# Patient Record
Sex: MALE | Race: WHITE | Employment: OTHER | URBAN - NONMETROPOLITAN AREA
[De-identification: names, ages, dates, MRNs, and addresses within clinical notes are randomized per-mention and may not be internally consistent; named-entity substitution may affect disease eponyms.]

---

## 2017-01-17 LAB
ALBUMIN SERPL-MCNC: 3.4 G/DL
ALP BLD-CCNC: 128 U/L
ALT SERPL-CCNC: 18 U/L
AST SERPL-CCNC: 16 U/L
BASOPHILS ABSOLUTE: ABNORMAL /ΜL
BASOPHILS RELATIVE PERCENT: ABNORMAL %
BILIRUB SERPL-MCNC: 0.2 MG/DL (ref 0.1–1.4)
BUN BLDV-MCNC: 51 MG/DL
CALCIUM SERPL-MCNC: 8.5 MG/DL
CHLORIDE BLD-SCNC: 104 MMOL/L
CO2: 26 MMOL/L
CREAT SERPL-MCNC: 2.4 MG/DL
EOSINOPHILS ABSOLUTE: ABNORMAL /ΜL
EOSINOPHILS RELATIVE PERCENT: ABNORMAL %
GFR CALCULATED: 28
GLUCOSE BLD-MCNC: 209 MG/DL
HBA1C MFR BLD: 8.5 %
HCT VFR BLD CALC: 33.7 % (ref 41–53)
HEMOGLOBIN: 10.7 G/DL (ref 13.5–17.5)
LYMPHOCYTES ABSOLUTE: ABNORMAL /ΜL
LYMPHOCYTES RELATIVE PERCENT: ABNORMAL %
MCH RBC QN AUTO: ABNORMAL PG
MCHC RBC AUTO-ENTMCNC: ABNORMAL G/DL
MCV RBC AUTO: ABNORMAL FL
MONOCYTES ABSOLUTE: ABNORMAL /ΜL
MONOCYTES RELATIVE PERCENT: ABNORMAL %
NEUTROPHILS ABSOLUTE: ABNORMAL /ΜL
NEUTROPHILS RELATIVE PERCENT: ABNORMAL %
PLATELET # BLD: 316 K/ΜL
PMV BLD AUTO: ABNORMAL FL
POTASSIUM SERPL-SCNC: 4.3 MMOL/L
RBC # BLD: 3.98 10^6/ΜL
SODIUM BLD-SCNC: 139 MMOL/L
TOTAL PROTEIN: 7.7
WBC # BLD: ABNORMAL 10^3/ML

## 2017-02-20 RX ORDER — OXYBUTYNIN CHLORIDE 10 MG/1
TABLET, EXTENDED RELEASE ORAL
Qty: 30 TABLET | Refills: 4 | Status: SHIPPED | OUTPATIENT
Start: 2017-02-20 | End: 2017-07-21 | Stop reason: SDUPTHER

## 2017-02-24 ENCOUNTER — OFFICE VISIT (OUTPATIENT)
Dept: NEPHROLOGY | Age: 77
End: 2017-02-24

## 2017-02-24 VITALS — SYSTOLIC BLOOD PRESSURE: 124 MMHG | BODY MASS INDEX: 38.63 KG/M2 | DIASTOLIC BLOOD PRESSURE: 80 MMHG | WEIGHT: 277 LBS

## 2017-02-24 DIAGNOSIS — E55.9 VITAMIN D DEFICIENCY: ICD-10-CM

## 2017-02-24 DIAGNOSIS — N18.30 CHRONIC KIDNEY DISEASE, STAGE III (MODERATE) (HCC): ICD-10-CM

## 2017-02-24 DIAGNOSIS — N18.3 DIABETES MELLITUS DUE TO UNDERLYING CONDITION WITH STAGE 3 CHRONIC KIDNEY DISEASE, UNSPECIFIED LONG TERM INSULIN USE STATUS: ICD-10-CM

## 2017-02-24 DIAGNOSIS — D63.1 ANEMIA IN CHRONIC RENAL DISEASE: ICD-10-CM

## 2017-02-24 DIAGNOSIS — I10 ESSENTIAL HYPERTENSION: ICD-10-CM

## 2017-02-24 DIAGNOSIS — E08.22 DIABETES MELLITUS DUE TO UNDERLYING CONDITION WITH STAGE 3 CHRONIC KIDNEY DISEASE, UNSPECIFIED LONG TERM INSULIN USE STATUS: ICD-10-CM

## 2017-02-24 DIAGNOSIS — N18.9 ANEMIA IN CHRONIC RENAL DISEASE: ICD-10-CM

## 2017-02-24 DIAGNOSIS — N17.9 AKI (ACUTE KIDNEY INJURY) (HCC): Primary | ICD-10-CM

## 2017-02-24 DIAGNOSIS — R80.9 PROTEINURIA: ICD-10-CM

## 2017-02-24 PROCEDURE — 4040F PNEUMOC VAC/ADMIN/RCVD: CPT | Performed by: INTERNAL MEDICINE

## 2017-02-24 PROCEDURE — G8419 CALC BMI OUT NRM PARAM NOF/U: HCPCS | Performed by: INTERNAL MEDICINE

## 2017-02-24 PROCEDURE — G8484 FLU IMMUNIZE NO ADMIN: HCPCS | Performed by: INTERNAL MEDICINE

## 2017-02-24 PROCEDURE — 1123F ACP DISCUSS/DSCN MKR DOCD: CPT | Performed by: INTERNAL MEDICINE

## 2017-02-24 PROCEDURE — 1036F TOBACCO NON-USER: CPT | Performed by: INTERNAL MEDICINE

## 2017-02-24 PROCEDURE — 99213 OFFICE O/P EST LOW 20 MIN: CPT | Performed by: INTERNAL MEDICINE

## 2017-02-24 PROCEDURE — G8427 DOCREV CUR MEDS BY ELIG CLIN: HCPCS | Performed by: INTERNAL MEDICINE

## 2017-02-27 LAB
BUN BLDV-MCNC: 31 MG/DL
CALCIUM SERPL-MCNC: 8.6 MG/DL
CHLORIDE BLD-SCNC: 104 MMOL/L
CO2: 29 MMOL/L
CREAT SERPL-MCNC: 1.9 MG/DL
GFR CALCULATED: 37
GLUCOSE BLD-MCNC: 182 MG/DL
POTASSIUM SERPL-SCNC: 4.3 MMOL/L
SODIUM BLD-SCNC: 140 MMOL/L

## 2017-03-10 ENCOUNTER — OFFICE VISIT (OUTPATIENT)
Dept: NEPHROLOGY | Age: 77
End: 2017-03-10

## 2017-03-10 VITALS
SYSTOLIC BLOOD PRESSURE: 102 MMHG | DIASTOLIC BLOOD PRESSURE: 80 MMHG | WEIGHT: 270 LBS | BODY MASS INDEX: 37.66 KG/M2 | HEART RATE: 68 BPM | RESPIRATION RATE: 20 BRPM

## 2017-03-10 DIAGNOSIS — D63.1 ANEMIA IN CHRONIC RENAL DISEASE: ICD-10-CM

## 2017-03-10 DIAGNOSIS — R80.9 PROTEINURIA: ICD-10-CM

## 2017-03-10 DIAGNOSIS — N17.9 AKI (ACUTE KIDNEY INJURY) (HCC): Primary | ICD-10-CM

## 2017-03-10 DIAGNOSIS — N18.30 CHRONIC KIDNEY DISEASE, STAGE III (MODERATE) (HCC): ICD-10-CM

## 2017-03-10 DIAGNOSIS — I10 ESSENTIAL HYPERTENSION: ICD-10-CM

## 2017-03-10 DIAGNOSIS — E08.22 DIABETES MELLITUS DUE TO UNDERLYING CONDITION WITH STAGE 3 CHRONIC KIDNEY DISEASE, UNSPECIFIED LONG TERM INSULIN USE STATUS: ICD-10-CM

## 2017-03-10 DIAGNOSIS — N18.9 ANEMIA IN CHRONIC RENAL DISEASE: ICD-10-CM

## 2017-03-10 DIAGNOSIS — E55.9 VITAMIN D DEFICIENCY: ICD-10-CM

## 2017-03-10 DIAGNOSIS — N18.3 DIABETES MELLITUS DUE TO UNDERLYING CONDITION WITH STAGE 3 CHRONIC KIDNEY DISEASE, UNSPECIFIED LONG TERM INSULIN USE STATUS: ICD-10-CM

## 2017-03-10 PROCEDURE — G8419 CALC BMI OUT NRM PARAM NOF/U: HCPCS | Performed by: INTERNAL MEDICINE

## 2017-03-10 PROCEDURE — 99214 OFFICE O/P EST MOD 30 MIN: CPT | Performed by: INTERNAL MEDICINE

## 2017-03-10 PROCEDURE — 4040F PNEUMOC VAC/ADMIN/RCVD: CPT | Performed by: INTERNAL MEDICINE

## 2017-03-10 PROCEDURE — G8427 DOCREV CUR MEDS BY ELIG CLIN: HCPCS | Performed by: INTERNAL MEDICINE

## 2017-03-10 PROCEDURE — 1123F ACP DISCUSS/DSCN MKR DOCD: CPT | Performed by: INTERNAL MEDICINE

## 2017-03-10 PROCEDURE — G8484 FLU IMMUNIZE NO ADMIN: HCPCS | Performed by: INTERNAL MEDICINE

## 2017-03-10 PROCEDURE — 1036F TOBACCO NON-USER: CPT | Performed by: INTERNAL MEDICINE

## 2017-07-21 RX ORDER — OXYBUTYNIN CHLORIDE 10 MG/1
TABLET, EXTENDED RELEASE ORAL
Qty: 30 TABLET | Refills: 4 | Status: SHIPPED | OUTPATIENT
Start: 2017-07-21

## 2017-08-31 LAB
BUN BLDV-MCNC: 39 MG/DL
CALCIUM SERPL-MCNC: 8.8 MG/DL
CHLORIDE BLD-SCNC: 105 MMOL/L
CO2: 21 MMOL/L
CREAT SERPL-MCNC: 2 MG/DL
GFR CALCULATED: 35
GLUCOSE BLD-MCNC: 165 MG/DL
PHOSPHORUS: 4.1 MG/DL
POTASSIUM SERPL-SCNC: 4.2 MMOL/L
PTH INTACT: 47.9
SODIUM BLD-SCNC: 137 MMOL/L
VITAMIN D 25-HYDROXY: 45.4
VITAMIN D2, 25 HYDROXY: NORMAL
VITAMIN D3,25 HYDROXY: NORMAL

## 2017-09-15 ENCOUNTER — OFFICE VISIT (OUTPATIENT)
Dept: NEPHROLOGY | Age: 77
End: 2017-09-15
Payer: MEDICARE

## 2017-09-15 VITALS
BODY MASS INDEX: 38.63 KG/M2 | DIASTOLIC BLOOD PRESSURE: 70 MMHG | SYSTOLIC BLOOD PRESSURE: 102 MMHG | WEIGHT: 277 LBS | RESPIRATION RATE: 16 BRPM | HEART RATE: 68 BPM

## 2017-09-15 DIAGNOSIS — D63.1 ANEMIA IN CHRONIC RENAL DISEASE: ICD-10-CM

## 2017-09-15 DIAGNOSIS — N18.9 ANEMIA IN CHRONIC RENAL DISEASE: ICD-10-CM

## 2017-09-15 DIAGNOSIS — N18.30 CHRONIC KIDNEY DISEASE, STAGE III (MODERATE) (HCC): Primary | ICD-10-CM

## 2017-09-15 DIAGNOSIS — R80.8 OTHER PROTEINURIA: ICD-10-CM

## 2017-09-15 DIAGNOSIS — I10 ESSENTIAL HYPERTENSION: ICD-10-CM

## 2017-09-15 DIAGNOSIS — N18.3 DIABETES MELLITUS DUE TO UNDERLYING CONDITION WITH STAGE 3 CHRONIC KIDNEY DISEASE, UNSPECIFIED LONG TERM INSULIN USE STATUS: ICD-10-CM

## 2017-09-15 DIAGNOSIS — E55.9 VITAMIN D DEFICIENCY: ICD-10-CM

## 2017-09-15 DIAGNOSIS — E08.22 DIABETES MELLITUS DUE TO UNDERLYING CONDITION WITH STAGE 3 CHRONIC KIDNEY DISEASE, UNSPECIFIED LONG TERM INSULIN USE STATUS: ICD-10-CM

## 2017-09-15 PROCEDURE — 1123F ACP DISCUSS/DSCN MKR DOCD: CPT | Performed by: INTERNAL MEDICINE

## 2017-09-15 PROCEDURE — 99213 OFFICE O/P EST LOW 20 MIN: CPT | Performed by: INTERNAL MEDICINE

## 2017-09-15 PROCEDURE — 4040F PNEUMOC VAC/ADMIN/RCVD: CPT | Performed by: INTERNAL MEDICINE

## 2017-09-15 PROCEDURE — G8417 CALC BMI ABV UP PARAM F/U: HCPCS | Performed by: INTERNAL MEDICINE

## 2017-09-15 PROCEDURE — 1036F TOBACCO NON-USER: CPT | Performed by: INTERNAL MEDICINE

## 2017-09-15 PROCEDURE — G8427 DOCREV CUR MEDS BY ELIG CLIN: HCPCS | Performed by: INTERNAL MEDICINE

## 2018-02-26 LAB
BUN BLDV-MCNC: 24 MG/DL
CALCIUM SERPL-MCNC: 9.1 MG/DL
CHLORIDE BLD-SCNC: 106 MMOL/L
CO2: 27 MMOL/L
CREAT SERPL-MCNC: 1.7 MG/DL
GFR CALCULATED: 42
GLUCOSE BLD-MCNC: 60 MG/DL
PHOSPHORUS: 3.5 MG/DL
POTASSIUM SERPL-SCNC: 3.7 MMOL/L
PTH INTACT: 49.1
SODIUM BLD-SCNC: 142 MMOL/L
VITAMIN D 25-HYDROXY: 45.2
VITAMIN D2, 25 HYDROXY: NORMAL
VITAMIN D3,25 HYDROXY: NORMAL

## 2018-03-09 ENCOUNTER — OFFICE VISIT (OUTPATIENT)
Dept: NEPHROLOGY | Age: 78
End: 2018-03-09
Payer: MEDICARE

## 2018-03-09 VITALS
HEART RATE: 72 BPM | BODY MASS INDEX: 37.66 KG/M2 | DIASTOLIC BLOOD PRESSURE: 90 MMHG | WEIGHT: 270 LBS | RESPIRATION RATE: 18 BRPM | SYSTOLIC BLOOD PRESSURE: 160 MMHG

## 2018-03-09 DIAGNOSIS — E55.9 VITAMIN D DEFICIENCY: ICD-10-CM

## 2018-03-09 DIAGNOSIS — D63.8 ANEMIA OF CHRONIC DISEASE: ICD-10-CM

## 2018-03-09 DIAGNOSIS — I10 ESSENTIAL HYPERTENSION: ICD-10-CM

## 2018-03-09 DIAGNOSIS — N18.3 DIABETES MELLITUS DUE TO UNDERLYING CONDITION WITH STAGE 3 CHRONIC KIDNEY DISEASE, UNSPECIFIED LONG TERM INSULIN USE STATUS: ICD-10-CM

## 2018-03-09 DIAGNOSIS — E08.22 DIABETES MELLITUS DUE TO UNDERLYING CONDITION WITH STAGE 3 CHRONIC KIDNEY DISEASE, UNSPECIFIED LONG TERM INSULIN USE STATUS: ICD-10-CM

## 2018-03-09 DIAGNOSIS — N18.30 CKD (CHRONIC KIDNEY DISEASE), STAGE III (HCC): Primary | ICD-10-CM

## 2018-03-09 PROCEDURE — G8484 FLU IMMUNIZE NO ADMIN: HCPCS | Performed by: INTERNAL MEDICINE

## 2018-03-09 PROCEDURE — G8417 CALC BMI ABV UP PARAM F/U: HCPCS | Performed by: INTERNAL MEDICINE

## 2018-03-09 PROCEDURE — 1036F TOBACCO NON-USER: CPT | Performed by: INTERNAL MEDICINE

## 2018-03-09 PROCEDURE — 99213 OFFICE O/P EST LOW 20 MIN: CPT | Performed by: INTERNAL MEDICINE

## 2018-03-09 PROCEDURE — G8427 DOCREV CUR MEDS BY ELIG CLIN: HCPCS | Performed by: INTERNAL MEDICINE

## 2018-03-09 PROCEDURE — 1123F ACP DISCUSS/DSCN MKR DOCD: CPT | Performed by: INTERNAL MEDICINE

## 2018-03-09 PROCEDURE — 4040F PNEUMOC VAC/ADMIN/RCVD: CPT | Performed by: INTERNAL MEDICINE

## 2018-03-09 RX ORDER — VITAMIN E 268 MG
400 CAPSULE ORAL DAILY
COMMUNITY
End: 2018-07-13 | Stop reason: ALTCHOICE

## 2018-03-09 RX ORDER — DULOXETIN HYDROCHLORIDE 30 MG/1
30 CAPSULE, DELAYED RELEASE ORAL DAILY
COMMUNITY
End: 2018-07-13 | Stop reason: ALTCHOICE

## 2018-03-09 RX ORDER — HYDRALAZINE HYDROCHLORIDE 25 MG/1
25 TABLET, FILM COATED ORAL 2 TIMES DAILY
COMMUNITY

## 2018-03-09 NOTE — PROGRESS NOTES
Renal Progress Note    Assessment and Plan:     1. CKD (chronic kidney disease), stage III     2. Essential hypertension     3. Diabetes mellitus due to underlying condition with stage 3 chronic kidney disease, unspecified long term insulin use status ([facility] [address]     4. Vitamin D deficiency     5. Anemia of chronic disease       PLAN:  Labs reviewed with the patient and he understood  Serum creatinine is improved to 1.7 mg/dL from 1.9 mg/dL  Vitamin D level is normal as 45  PTH is normal at 49  Medications reviewed  No changes  Return visit in 6 months and maybe 12 months there after if the serum creatinine improves more    Patient Active Problem List   Diagnosis    Type II or unspecified type diabetes mellitus without mention of complication, not stated as uncontrolled    Chronic kidney disease, stage III (moderate)    Hypertension    Proteinuria    Vitamin D deficiency    Anemia in chronic renal disease    Diabetes mellitus with renal manifestation (HCC)    ADELINA (acute kidney injury) ([facility] [address]    CKD (chronic kidney disease), stage III    Anemia of chronic disease       Subjective:   Chief complaint:  Chief Complaint   Patient presents with    6 Month Follow-Up    Chronic Kidney Disease     Stage 3      HPI:This is a follow up visit for Mr. Michelet Adkins who is here today for return appointment. I see him for chronic kidney disease. He was last seen about 6 months ago. Serum creatinine was 1.9 mg/dL. Today it is 1.7 mg/dL. He is doing well with no complaint. He saw Dr. Jose C Ashton from urology and was prescribed a new medication for urinary frequency.   myebetriq    ROS:Constitutional: negative  Eyes: negative  Ears, nose, mouth, throat, and face: negative  Respiratory: negative  Cardiovascular: positive for lower extremity edema  Gastrointestinal: negative  Genitourinary:positive for frequency  Integument/breast: negative  Hematologic/lymphatic: negative  Musculoskeletal:positive for arthralgias and stiff

## 2018-05-02 LAB
AVERAGE GLUCOSE: NORMAL
BUN BLDV-MCNC: 35 MG/DL
CALCIUM SERPL-MCNC: 8.8 MG/DL
CHLORIDE BLD-SCNC: 104 MMOL/L
CO2: 28 MMOL/L
CREAT SERPL-MCNC: 2.1 MG/DL
GFR CALCULATED: 33
GLUCOSE BLD-MCNC: 116 MG/DL
HBA1C MFR BLD: 8.6 %
POTASSIUM SERPL-SCNC: 4.6 MMOL/L
SODIUM BLD-SCNC: 138 MMOL/L

## 2018-06-21 LAB
BASOPHILS ABSOLUTE: ABNORMAL /ΜL
BASOPHILS RELATIVE PERCENT: ABNORMAL %
BUN BLDV-MCNC: 47 MG/DL
CALCIUM SERPL-MCNC: 8.7 MG/DL
CHLORIDE BLD-SCNC: 105 MMOL/L
CO2: 19 MMOL/L
CREAT SERPL-MCNC: 2 MG/DL
EOSINOPHILS ABSOLUTE: ABNORMAL /ΜL
EOSINOPHILS RELATIVE PERCENT: ABNORMAL %
GFR CALCULATED: 35
GLUCOSE BLD-MCNC: 159 MG/DL
HCT VFR BLD CALC: 33.9 % (ref 41–53)
HEMOGLOBIN: 10.6 G/DL (ref 13.5–17.5)
LYMPHOCYTES ABSOLUTE: ABNORMAL /ΜL
LYMPHOCYTES RELATIVE PERCENT: ABNORMAL %
MCH RBC QN AUTO: ABNORMAL PG
MCHC RBC AUTO-ENTMCNC: ABNORMAL G/DL
MCV RBC AUTO: ABNORMAL FL
MONOCYTES ABSOLUTE: ABNORMAL /ΜL
MONOCYTES RELATIVE PERCENT: ABNORMAL %
NEUTROPHILS ABSOLUTE: ABNORMAL /ΜL
NEUTROPHILS RELATIVE PERCENT: ABNORMAL %
PLATELET # BLD: 296 K/ΜL
PMV BLD AUTO: ABNORMAL FL
POTASSIUM SERPL-SCNC: 4.4 MMOL/L
RBC # BLD: 4.15 10^6/ΜL
SODIUM BLD-SCNC: 138 MMOL/L
WBC # BLD: 7.63 10^3/ML

## 2018-07-10 ENCOUNTER — HOSPITAL ENCOUNTER (OUTPATIENT)
Dept: GENERAL RADIOLOGY | Age: 78
Discharge: HOME | End: 2018-07-10
Payer: MEDICARE

## 2018-07-10 ENCOUNTER — HOSPITAL ENCOUNTER (OUTPATIENT)
Dept: PREADMISSION TESTING | Age: 78
Discharge: HOME | End: 2018-07-14
Payer: MEDICARE

## 2018-07-10 VITALS
WEIGHT: 275.57 LBS | DIASTOLIC BLOOD PRESSURE: 67 MMHG | HEART RATE: 80 BPM | BODY MASS INDEX: 38.58 KG/M2 | RESPIRATION RATE: 16 BRPM | OXYGEN SATURATION: 97 % | TEMPERATURE: 97.5 F | HEIGHT: 71 IN | SYSTOLIC BLOOD PRESSURE: 142 MMHG

## 2018-07-10 DIAGNOSIS — Z01.818 PRE-OP TESTING: ICD-10-CM

## 2018-07-10 LAB
ALBUMIN SERPL-MCNC: 3.7 G/DL (ref 3.5–5.1)
ANION GAP SERPL CALCULATED.3IONS-SCNC: 15 MEQ/L (ref 8–16)
AVERAGE GLUCOSE: 186 MG/DL (ref 70–126)
BASOPHILS # BLD: 0.4 %
BASOPHILS ABSOLUTE: 0 THOU/MM3 (ref 0–0.1)
BUN BLDV-MCNC: 36 MG/DL (ref 7–22)
CALCIUM SERPL-MCNC: 8.9 MG/DL (ref 8.5–10.5)
CHLORIDE BLD-SCNC: 106 MEQ/L (ref 98–111)
CO2: 21 MEQ/L (ref 23–33)
CREAT SERPL-MCNC: 1.9 MG/DL (ref 0.4–1.2)
EKG ATRIAL RATE: 63 BPM
EKG P AXIS: 28 DEGREES
EKG P-R INTERVAL: 230 MS
EKG Q-T INTERVAL: 464 MS
EKG QRS DURATION: 156 MS
EKG QTC CALCULATION (BAZETT): 474 MS
EKG R AXIS: -73 DEGREES
EKG T AXIS: 8 DEGREES
EKG VENTRICULAR RATE: 63 BPM
EOSINOPHIL # BLD: 2.3 %
EOSINOPHILS ABSOLUTE: 0.2 THOU/MM3 (ref 0–0.4)
ERYTHROCYTE [DISTWIDTH] IN BLOOD BY AUTOMATED COUNT: 14 % (ref 11.5–14.5)
ERYTHROCYTE [DISTWIDTH] IN BLOOD BY AUTOMATED COUNT: 42.3 FL (ref 35–45)
GFR SERPL CREATININE-BSD FRML MDRD: 34 ML/MIN/1.73M2
GLUCOSE BLD-MCNC: 258 MG/DL (ref 70–108)
HBA1C MFR BLD: 8.2 % (ref 4.4–6.4)
HCT VFR BLD CALC: 34 % (ref 42–52)
HEMOGLOBIN: 10.6 GM/DL (ref 14–18)
IMMATURE GRANS (ABS): 0.02 THOU/MM3 (ref 0–0.07)
IMMATURE GRANULOCYTES: 0.2 %
LYMPHOCYTES # BLD: 20.4 %
LYMPHOCYTES ABSOLUTE: 1.9 THOU/MM3 (ref 1–4.8)
MCH RBC QN AUTO: 25.9 PG (ref 26–33)
MCHC RBC AUTO-ENTMCNC: 31.2 GM/DL (ref 32.2–35.5)
MCV RBC AUTO: 82.9 FL (ref 80–94)
MONOCYTES # BLD: 7.9 %
MONOCYTES ABSOLUTE: 0.7 THOU/MM3 (ref 0.4–1.3)
MRSA NASAL SCREEN RT-PCR: NEGATIVE
NUCLEATED RED BLOOD CELLS: 0 /100 WBC
PLATELET # BLD: 258 THOU/MM3 (ref 130–400)
PMV BLD AUTO: 8.9 FL (ref 9.4–12.4)
POTASSIUM SERPL-SCNC: 4.8 MEQ/L (ref 3.5–5.2)
PREALBUMIN: 24.3 MG/DL (ref 20–40)
RBC # BLD: 4.1 MILL/MM3 (ref 4.7–6.1)
SEG NEUTROPHILS: 68.8 %
SEGMENTED NEUTROPHILS ABSOLUTE COUNT: 6.4 THOU/MM3 (ref 1.8–7.7)
SODIUM BLD-SCNC: 142 MEQ/L (ref 135–145)
STAPH AUREUS SCREEN RT-PCR: NEGATIVE
WBC # BLD: 9.3 THOU/MM3 (ref 4.8–10.8)

## 2018-07-10 PROCEDURE — 80048 BASIC METABOLIC PNL TOTAL CA: CPT

## 2018-07-10 PROCEDURE — 87640 STAPH A DNA AMP PROBE: CPT

## 2018-07-10 PROCEDURE — 87641 MR-STAPH DNA AMP PROBE: CPT

## 2018-07-10 PROCEDURE — 82040 ASSAY OF SERUM ALBUMIN: CPT

## 2018-07-10 PROCEDURE — 85025 COMPLETE CBC W/AUTO DIFF WBC: CPT

## 2018-07-10 PROCEDURE — 83036 HEMOGLOBIN GLYCOSYLATED A1C: CPT

## 2018-07-10 PROCEDURE — 84134 ASSAY OF PREALBUMIN: CPT

## 2018-07-10 PROCEDURE — 71046 X-RAY EXAM CHEST 2 VIEWS: CPT

## 2018-07-10 PROCEDURE — 87081 CULTURE SCREEN ONLY: CPT

## 2018-07-10 PROCEDURE — 93005 ELECTROCARDIOGRAM TRACING: CPT | Performed by: ORTHOPAEDIC SURGERY

## 2018-07-10 PROCEDURE — 36415 COLL VENOUS BLD VENIPUNCTURE: CPT

## 2018-07-10 RX ORDER — TRAMADOL HYDROCHLORIDE 50 MG/1
50 TABLET ORAL EVERY 6 HOURS PRN
COMMUNITY

## 2018-07-11 PROCEDURE — 93010 ELECTROCARDIOGRAM REPORT: CPT | Performed by: INTERNAL MEDICINE

## 2018-07-12 LAB — MRSA SCREEN: NORMAL

## 2018-07-13 ENCOUNTER — OFFICE VISIT (OUTPATIENT)
Dept: NEPHROLOGY | Age: 78
End: 2018-07-13
Payer: MEDICARE

## 2018-07-13 VITALS — SYSTOLIC BLOOD PRESSURE: 130 MMHG | DIASTOLIC BLOOD PRESSURE: 64 MMHG | BODY MASS INDEX: 38.35 KG/M2 | WEIGHT: 275 LBS

## 2018-07-13 DIAGNOSIS — E55.9 VITAMIN D DEFICIENCY: ICD-10-CM

## 2018-07-13 DIAGNOSIS — I10 ESSENTIAL HYPERTENSION: ICD-10-CM

## 2018-07-13 DIAGNOSIS — D63.8 ANEMIA OF CHRONIC DISEASE: ICD-10-CM

## 2018-07-13 DIAGNOSIS — N18.3 DIABETES MELLITUS DUE TO UNDERLYING CONDITION WITH STAGE 3 CHRONIC KIDNEY DISEASE, UNSPECIFIED LONG TERM INSULIN USE STATUS: ICD-10-CM

## 2018-07-13 DIAGNOSIS — E08.22 DIABETES MELLITUS DUE TO UNDERLYING CONDITION WITH STAGE 3 CHRONIC KIDNEY DISEASE, UNSPECIFIED LONG TERM INSULIN USE STATUS: ICD-10-CM

## 2018-07-13 DIAGNOSIS — E87.20 METABOLIC ACIDOSIS: ICD-10-CM

## 2018-07-13 DIAGNOSIS — N18.30 CKD (CHRONIC KIDNEY DISEASE), STAGE III (HCC): Primary | ICD-10-CM

## 2018-07-13 PROCEDURE — 1036F TOBACCO NON-USER: CPT | Performed by: INTERNAL MEDICINE

## 2018-07-13 PROCEDURE — 4040F PNEUMOC VAC/ADMIN/RCVD: CPT | Performed by: INTERNAL MEDICINE

## 2018-07-13 PROCEDURE — 99214 OFFICE O/P EST MOD 30 MIN: CPT | Performed by: INTERNAL MEDICINE

## 2018-07-13 PROCEDURE — G8427 DOCREV CUR MEDS BY ELIG CLIN: HCPCS | Performed by: INTERNAL MEDICINE

## 2018-07-13 PROCEDURE — 1123F ACP DISCUSS/DSCN MKR DOCD: CPT | Performed by: INTERNAL MEDICINE

## 2018-07-13 PROCEDURE — 1101F PT FALLS ASSESS-DOCD LE1/YR: CPT | Performed by: INTERNAL MEDICINE

## 2018-07-13 PROCEDURE — G8417 CALC BMI ABV UP PARAM F/U: HCPCS | Performed by: INTERNAL MEDICINE

## 2018-07-13 NOTE — PROGRESS NOTES
edema  Gastrointestinal: negative  Genitourinary:negative  Integument/breast: negative  Hematologic/lymphatic: negative  Musculoskeletal:positive for arthralgias, back pain, bone pain and stiff joints  Neurological: positive for coordination problems and gait problems  Behavioral/Psych: negative  Endocrine: negative  Allergic/Immunologic: negative  Medications:     Current Outpatient Prescriptions   Medication Sig Dispense Refill    traMADol (ULTRAM) 50 MG tablet Take 50 mg by mouth every 6 hours as needed for Pain. Vida Ortega hydrALAZINE (APRESOLINE) 25 MG tablet Take 25 mg by mouth 2 times daily      oxybutynin (DITROPAN-XL) 10 MG extended release tablet TAKE ONE TABLET BY MOUTH DAILY 30 tablet 4    allopurinol (ZYLOPRIM) 100 MG tablet Take 100 mg by mouth daily      carvedilol (COREG) 12.5 MG tablet Take 12.5 mg by mouth 2 times daily       cloNIDine (CATAPRES) 0.3 MG tablet Take 0.3 mg by mouth 3 times daily       aspirin 81 MG tablet Take 81 mg by mouth daily.  insulin lispro (HUMALOG KWIKPEN) 100 UNIT/ML injection Inject 30 Units into the skin 3 times daily (before meals)       insulin glargine (LANTUS SOLOSTAR) 100 UNIT/ML injection Inject 44 Units into the skin nightly       simvastatin (ZOCOR) 40 MG tablet Take 40 mg by mouth nightly.  Specialty Vitamins Products (PROSTATE PO) Take by mouth every morning       No current facility-administered medications for this visit.         Lab Results:    CBC:   Lab Results   Component Value Date    WBC 9.3 07/10/2018    HGB 10.6 (L) 07/10/2018    HCT 34.0 (L) 07/10/2018    MCV 82.9 07/10/2018     07/10/2018     BMP:    Lab Results   Component Value Date     07/10/2018     06/21/2018     05/02/2018    K 4.8 07/10/2018    K 4.4 06/21/2018    K 4.6 05/02/2018     07/10/2018     06/21/2018     05/02/2018    CO2 21 (L) 07/10/2018    CO2 19 06/21/2018    CO2 28 05/02/2018    BUN 36 (H) 07/10/2018    BUN 47 06/21/2018 BUN 35 05/02/2018    CREATININE 1.9 (H) 07/10/2018    CREATININE 2.0 06/21/2018    CREATININE 2.1 05/02/2018    GLUCOSE 258 (H) 07/10/2018    GLUCOSE 159 06/21/2018    GLUCOSE 116 05/02/2018      Hepatic:   Lab Results   Component Value Date    AST 16 01/17/2017    ALT 18 01/17/2017    BILITOT 0.2 01/17/2017    ALKPHOS 128 01/17/2017     BNP: No results found for: BNP  Lipids: No results found for: CHOL, HDL  INR:   Lab Results   Component Value Date    INR 0.98 07/25/2014    INR 0.95 07/03/2014    INR 0.94 05/30/2014     URINE: No results found for: Laura Anaya  No results found for: NITRU, COLORU, PHUR, LABCAST, WBCUA, RBCUA, MUCUS, TRICHOMONAS, YEAST, BACTERIA, CLARITYU, SPECGRAV, LEUKOCYTESUR, UROBILINOGEN, BILIRUBINUR, BLOODU, GLUCOSEU, KETUA, AMORPHOUS   Microalbumen/Creatinine ratio:  No components found for: RUCREAT    Objective:   Vitals: /64   Wt 275 lb (124.7 kg)   BMI 38.35 kg/m²      Constitutional:  Alert, awake, no apparent distress  Skin:normal  HEENT:Pupils are reactive . Throat is clear  Neck:supple with no thyromegaly  Cardiovascular:  S1, S2 without murmur  Respiratory:  Clear  Abdomen: +bs, soft, non tender  Ext: + LE edema  Musculoskeletal:Intact  Neuro:Alert and oriented with no deficit    Electronically signed by La Coats MD on 7/13/2018 at 9:18 AM

## 2018-08-01 NOTE — PROGRESS NOTES
Called medical records at [facility] and spoke with [facility], requested copy of Arabpour clearance

## 2018-08-05 ENCOUNTER — ANESTHESIA EVENT (OUTPATIENT)
Dept: OPERATING ROOM | Age: 78
DRG: 460 | End: 2018-08-05
Payer: MEDICARE

## 2018-08-06 ENCOUNTER — HOSPITAL ENCOUNTER (INPATIENT)
Age: 78
LOS: 6 days | Discharge: HOME HEALTH | DRG: 460 | End: 2018-08-12
Attending: ORTHOPAEDIC SURGERY | Admitting: ORTHOPAEDIC SURGERY
Payer: MEDICARE

## 2018-08-06 ENCOUNTER — APPOINTMENT (OUTPATIENT)
Dept: GENERAL RADIOLOGY | Age: 78
DRG: 460 | End: 2018-08-06
Attending: ORTHOPAEDIC SURGERY
Payer: MEDICARE

## 2018-08-06 ENCOUNTER — ANESTHESIA (OUTPATIENT)
Dept: OPERATING ROOM | Age: 78
DRG: 460 | End: 2018-08-06
Payer: MEDICARE

## 2018-08-06 VITALS
OXYGEN SATURATION: 100 % | RESPIRATION RATE: 26 BRPM | TEMPERATURE: 96.1 F | DIASTOLIC BLOOD PRESSURE: 67 MMHG | SYSTOLIC BLOOD PRESSURE: 137 MMHG

## 2018-08-06 PROBLEM — M48.062 SPINAL STENOSIS OF LUMBAR REGION WITH NEUROGENIC CLAUDICATION: Status: ACTIVE | Noted: 2018-08-06

## 2018-08-06 LAB
ABO: NORMAL
ANTIBODY SCREEN: NORMAL
GLUCOSE BLD-MCNC: 192 MG/DL (ref 70–108)
GLUCOSE BLD-MCNC: 210 MG/DL (ref 70–108)
GLUCOSE BLD-MCNC: 215 MG/DL (ref 70–108)
MRSA SCREEN RT-PCR: NEGATIVE
RH FACTOR: NORMAL

## 2018-08-06 PROCEDURE — 0SG1071 FUSION OF 2 OR MORE LUMBAR VERTEBRAL JOINTS WITH AUTOLOGOUS TISSUE SUBSTITUTE, POSTERIOR APPROACH, POSTERIOR COLUMN, OPEN APPROACH: ICD-10-PCS | Performed by: ORTHOPAEDIC SURGERY

## 2018-08-06 PROCEDURE — 2580000003 HC RX 258: Performed by: ORTHOPAEDIC SURGERY

## 2018-08-06 PROCEDURE — 2500000003 HC RX 250 WO HCPCS: Performed by: NURSE ANESTHETIST, CERTIFIED REGISTERED

## 2018-08-06 PROCEDURE — 72100 X-RAY EXAM L-S SPINE 2/3 VWS: CPT

## 2018-08-06 PROCEDURE — 7100000000 HC PACU RECOVERY - FIRST 15 MIN: Performed by: ORTHOPAEDIC SURGERY

## 2018-08-06 PROCEDURE — 95940 IONM IN OPERATNG ROOM 15 MIN: CPT | Performed by: ORTHOPAEDIC SURGERY

## 2018-08-06 PROCEDURE — 6360000002 HC RX W HCPCS: Performed by: NURSE ANESTHETIST, CERTIFIED REGISTERED

## 2018-08-06 PROCEDURE — 86923 COMPATIBILITY TEST ELECTRIC: CPT

## 2018-08-06 PROCEDURE — 87081 CULTURE SCREEN ONLY: CPT

## 2018-08-06 PROCEDURE — 86850 RBC ANTIBODY SCREEN: CPT

## 2018-08-06 PROCEDURE — 6360000002 HC RX W HCPCS: Performed by: PHYSICIAN ASSISTANT

## 2018-08-06 PROCEDURE — 2709999900 HC NON-CHARGEABLE SUPPLY

## 2018-08-06 PROCEDURE — 6370000000 HC RX 637 (ALT 250 FOR IP): Performed by: ORTHOPAEDIC SURGERY

## 2018-08-06 PROCEDURE — P9045 ALBUMIN (HUMAN), 5%, 250 ML: HCPCS | Performed by: NURSE ANESTHETIST, CERTIFIED REGISTERED

## 2018-08-06 PROCEDURE — 86901 BLOOD TYPING SEROLOGIC RH(D): CPT

## 2018-08-06 PROCEDURE — 2580000003 HC RX 258: Performed by: PHYSICIAN ASSISTANT

## 2018-08-06 PROCEDURE — 2580000003 HC RX 258: Performed by: NURSE ANESTHETIST, CERTIFIED REGISTERED

## 2018-08-06 PROCEDURE — P9016 RBC LEUKOCYTES REDUCED: HCPCS

## 2018-08-06 PROCEDURE — 36415 COLL VENOUS BLD VENIPUNCTURE: CPT

## 2018-08-06 PROCEDURE — 3700000000 HC ANESTHESIA ATTENDED CARE: Performed by: ORTHOPAEDIC SURGERY

## 2018-08-06 PROCEDURE — 72020 X-RAY EXAM OF SPINE 1 VIEW: CPT

## 2018-08-06 PROCEDURE — 3600000005 HC SURGERY LEVEL 5 BASE: Performed by: ORTHOPAEDIC SURGERY

## 2018-08-06 PROCEDURE — 2000000000 HC ICU R&B

## 2018-08-06 PROCEDURE — 7100000001 HC PACU RECOVERY - ADDTL 15 MIN: Performed by: ORTHOPAEDIC SURGERY

## 2018-08-06 PROCEDURE — 3700000001 HC ADD 15 MINUTES (ANESTHESIA): Performed by: ORTHOPAEDIC SURGERY

## 2018-08-06 PROCEDURE — C1713 ANCHOR/SCREW BN/BN,TIS/BN: HCPCS | Performed by: ORTHOPAEDIC SURGERY

## 2018-08-06 PROCEDURE — 2720000010 HC SURG SUPPLY STERILE: Performed by: ORTHOPAEDIC SURGERY

## 2018-08-06 PROCEDURE — 6360000002 HC RX W HCPCS: Performed by: ORTHOPAEDIC SURGERY

## 2018-08-06 PROCEDURE — 6360000002 HC RX W HCPCS

## 2018-08-06 PROCEDURE — 82948 REAGENT STRIP/BLOOD GLUCOSE: CPT

## 2018-08-06 PROCEDURE — 2709999900 HC NON-CHARGEABLE SUPPLY: Performed by: ORTHOPAEDIC SURGERY

## 2018-08-06 PROCEDURE — 3600000015 HC SURGERY LEVEL 5 ADDTL 15MIN: Performed by: ORTHOPAEDIC SURGERY

## 2018-08-06 PROCEDURE — 87641 MR-STAPH DNA AMP PROBE: CPT

## 2018-08-06 PROCEDURE — 2500000003 HC RX 250 WO HCPCS: Performed by: ORTHOPAEDIC SURGERY

## 2018-08-06 PROCEDURE — 87086 URINE CULTURE/COLONY COUNT: CPT

## 2018-08-06 PROCEDURE — 0SG3071 FUSION OF LUMBOSACRAL JOINT WITH AUTOLOGOUS TISSUE SUBSTITUTE, POSTERIOR APPROACH, POSTERIOR COLUMN, OPEN APPROACH: ICD-10-PCS | Performed by: ORTHOPAEDIC SURGERY

## 2018-08-06 PROCEDURE — 86900 BLOOD TYPING SEROLOGIC ABO: CPT

## 2018-08-06 DEVICE — SET SCREW 5540030 5.5 TI NS BRK OFF
Type: IMPLANTABLE DEVICE | Site: SPINE LUMBAR | Status: FUNCTIONAL
Brand: CD HORIZON® SPINAL SYSTEM

## 2018-08-06 DEVICE — DBM T42200 2.5CMX10CM 2 EACH GRAFTON MAT
Type: IMPLANTABLE DEVICE | Site: SPINE LUMBAR | Status: FUNCTIONAL
Brand: GRAFTON®AND GRAFTON PLUS®DEMINERALIZED BONE MATRIX (DBM)

## 2018-08-06 RX ORDER — CARVEDILOL 6.25 MG/1
12.5 TABLET ORAL 2 TIMES DAILY WITH MEALS
Status: DISCONTINUED | OUTPATIENT
Start: 2018-08-06 | End: 2018-08-12 | Stop reason: HOSPADM

## 2018-08-06 RX ORDER — ALBUMIN, HUMAN INJ 5% 5 %
SOLUTION INTRAVENOUS PRN
Status: DISCONTINUED | OUTPATIENT
Start: 2018-08-06 | End: 2018-08-06 | Stop reason: SDUPTHER

## 2018-08-06 RX ORDER — SIMVASTATIN 40 MG
40 TABLET ORAL NIGHTLY
Status: DISCONTINUED | OUTPATIENT
Start: 2018-08-06 | End: 2018-08-12 | Stop reason: HOSPADM

## 2018-08-06 RX ORDER — LABETALOL HYDROCHLORIDE 5 MG/ML
10 INJECTION, SOLUTION INTRAVENOUS EVERY 10 MIN PRN
Status: DISCONTINUED | OUTPATIENT
Start: 2018-08-06 | End: 2018-08-06 | Stop reason: HOSPADM

## 2018-08-06 RX ORDER — OXYBUTYNIN CHLORIDE 10 MG/1
1 TABLET, EXTENDED RELEASE ORAL DAILY
Status: DISCONTINUED | OUTPATIENT
Start: 2018-08-06 | End: 2018-08-12 | Stop reason: HOSPADM

## 2018-08-06 RX ORDER — SODIUM CHLORIDE 0.9 % (FLUSH) 0.9 %
10 SYRINGE (ML) INJECTION PRN
Status: DISCONTINUED | OUTPATIENT
Start: 2018-08-06 | End: 2018-08-12 | Stop reason: HOSPADM

## 2018-08-06 RX ORDER — HYDROMORPHONE HCL 110MG/55ML
PATIENT CONTROLLED ANALGESIA SYRINGE INTRAVENOUS PRN
Status: DISCONTINUED | OUTPATIENT
Start: 2018-08-06 | End: 2018-08-06 | Stop reason: SDUPTHER

## 2018-08-06 RX ORDER — ALLOPURINOL 100 MG/1
100 TABLET ORAL DAILY
Status: DISCONTINUED | OUTPATIENT
Start: 2018-08-06 | End: 2018-08-12 | Stop reason: HOSPADM

## 2018-08-06 RX ORDER — CYCLOBENZAPRINE HCL 10 MG
10 TABLET ORAL 3 TIMES DAILY PRN
Status: DISCONTINUED | OUTPATIENT
Start: 2018-08-06 | End: 2018-08-12 | Stop reason: HOSPADM

## 2018-08-06 RX ORDER — SUCCINYLCHOLINE/SOD CL,ISO/PF 100 MG/5ML
SYRINGE (ML) INTRAVENOUS PRN
Status: DISCONTINUED | OUTPATIENT
Start: 2018-08-06 | End: 2018-08-06 | Stop reason: SDUPTHER

## 2018-08-06 RX ORDER — ACETAMINOPHEN 650 MG/1
650 SUPPOSITORY RECTAL EVERY 4 HOURS PRN
Status: DISCONTINUED | OUTPATIENT
Start: 2018-08-06 | End: 2018-08-12 | Stop reason: HOSPADM

## 2018-08-06 RX ORDER — DOCUSATE SODIUM 100 MG/1
100 CAPSULE, LIQUID FILLED ORAL 2 TIMES DAILY
Status: DISCONTINUED | OUTPATIENT
Start: 2018-08-06 | End: 2018-08-09

## 2018-08-06 RX ORDER — EPHEDRINE SULFATE/0.9% NACL/PF 50 MG/5 ML
SYRINGE (ML) INTRAVENOUS PRN
Status: DISCONTINUED | OUTPATIENT
Start: 2018-08-06 | End: 2018-08-06 | Stop reason: SDUPTHER

## 2018-08-06 RX ORDER — 0.9 % SODIUM CHLORIDE 0.9 %
250 INTRAVENOUS SOLUTION INTRAVENOUS ONCE
Status: DISCONTINUED | OUTPATIENT
Start: 2018-08-06 | End: 2018-08-06

## 2018-08-06 RX ORDER — PROMETHAZINE HYDROCHLORIDE 25 MG/ML
12.5 INJECTION, SOLUTION INTRAMUSCULAR; INTRAVENOUS
Status: DISCONTINUED | OUTPATIENT
Start: 2018-08-06 | End: 2018-08-06 | Stop reason: HOSPADM

## 2018-08-06 RX ORDER — FENTANYL CITRATE 50 UG/ML
INJECTION, SOLUTION INTRAMUSCULAR; INTRAVENOUS PRN
Status: DISCONTINUED | OUTPATIENT
Start: 2018-08-06 | End: 2018-08-06 | Stop reason: SDUPTHER

## 2018-08-06 RX ORDER — NICOTINE POLACRILEX 4 MG
15 LOZENGE BUCCAL PRN
Status: DISCONTINUED | OUTPATIENT
Start: 2018-08-06 | End: 2018-08-12 | Stop reason: HOSPADM

## 2018-08-06 RX ORDER — FENTANYL CITRATE 50 UG/ML
50 INJECTION, SOLUTION INTRAMUSCULAR; INTRAVENOUS EVERY 5 MIN PRN
Status: DISCONTINUED | OUTPATIENT
Start: 2018-08-06 | End: 2018-08-06 | Stop reason: HOSPADM

## 2018-08-06 RX ORDER — SODIUM CHLORIDE 0.9 % (FLUSH) 0.9 %
10 SYRINGE (ML) INJECTION EVERY 12 HOURS SCHEDULED
Status: DISCONTINUED | OUTPATIENT
Start: 2018-08-06 | End: 2018-08-12 | Stop reason: HOSPADM

## 2018-08-06 RX ORDER — INSULIN GLARGINE 100 [IU]/ML
44 INJECTION, SOLUTION SUBCUTANEOUS NIGHTLY
Status: DISCONTINUED | OUTPATIENT
Start: 2018-08-06 | End: 2018-08-12 | Stop reason: HOSPADM

## 2018-08-06 RX ORDER — PROPOFOL 10 MG/ML
INJECTION, EMULSION INTRAVENOUS PRN
Status: DISCONTINUED | OUTPATIENT
Start: 2018-08-06 | End: 2018-08-06 | Stop reason: SDUPTHER

## 2018-08-06 RX ORDER — OXYCODONE HYDROCHLORIDE AND ACETAMINOPHEN 5; 325 MG/1; MG/1
1 TABLET ORAL EVERY 4 HOURS PRN
Status: DISCONTINUED | OUTPATIENT
Start: 2018-08-06 | End: 2018-08-12 | Stop reason: HOSPADM

## 2018-08-06 RX ORDER — ONDANSETRON 4 MG/1
4 TABLET, FILM COATED ORAL EVERY 6 HOURS PRN
Status: DISCONTINUED | OUTPATIENT
Start: 2018-08-06 | End: 2018-08-12 | Stop reason: HOSPADM

## 2018-08-06 RX ORDER — ONDANSETRON 2 MG/ML
4 INJECTION INTRAMUSCULAR; INTRAVENOUS ONCE
Status: COMPLETED | OUTPATIENT
Start: 2018-08-06 | End: 2018-08-06

## 2018-08-06 RX ORDER — SODIUM CHLORIDE 9 MG/ML
INJECTION, SOLUTION INTRAVENOUS CONTINUOUS PRN
Status: DISCONTINUED | OUTPATIENT
Start: 2018-08-06 | End: 2018-08-06

## 2018-08-06 RX ORDER — DEXTROSE MONOHYDRATE 50 MG/ML
100 INJECTION, SOLUTION INTRAVENOUS PRN
Status: DISCONTINUED | OUTPATIENT
Start: 2018-08-06 | End: 2018-08-12 | Stop reason: HOSPADM

## 2018-08-06 RX ORDER — OXYCODONE HYDROCHLORIDE AND ACETAMINOPHEN 5; 325 MG/1; MG/1
2 TABLET ORAL EVERY 4 HOURS PRN
Status: DISCONTINUED | OUTPATIENT
Start: 2018-08-06 | End: 2018-08-12 | Stop reason: HOSPADM

## 2018-08-06 RX ORDER — ONDANSETRON 2 MG/ML
4 INJECTION INTRAMUSCULAR; INTRAVENOUS EVERY 6 HOURS PRN
Status: DISCONTINUED | OUTPATIENT
Start: 2018-08-06 | End: 2018-08-06 | Stop reason: RX

## 2018-08-06 RX ORDER — ONDANSETRON 2 MG/ML
INJECTION INTRAMUSCULAR; INTRAVENOUS
Status: COMPLETED
Start: 2018-08-06 | End: 2018-08-06

## 2018-08-06 RX ORDER — ACETAMINOPHEN 325 MG/1
650 TABLET ORAL EVERY 4 HOURS PRN
Status: DISCONTINUED | OUTPATIENT
Start: 2018-08-06 | End: 2018-08-12 | Stop reason: HOSPADM

## 2018-08-06 RX ORDER — DEXTROSE MONOHYDRATE 25 G/50ML
12.5 INJECTION, SOLUTION INTRAVENOUS PRN
Status: DISCONTINUED | OUTPATIENT
Start: 2018-08-06 | End: 2018-08-12 | Stop reason: HOSPADM

## 2018-08-06 RX ORDER — SODIUM CHLORIDE 9 MG/ML
INJECTION, SOLUTION INTRAVENOUS CONTINUOUS
Status: DISCONTINUED | OUTPATIENT
Start: 2018-08-06 | End: 2018-08-06

## 2018-08-06 RX ORDER — OXYCODONE HCL 10 MG/1
10 TABLET, FILM COATED, EXTENDED RELEASE ORAL EVERY 12 HOURS SCHEDULED
Status: DISCONTINUED | OUTPATIENT
Start: 2018-08-06 | End: 2018-08-10

## 2018-08-06 RX ORDER — HYDRALAZINE HYDROCHLORIDE 25 MG/1
25 TABLET, FILM COATED ORAL 2 TIMES DAILY
Status: DISCONTINUED | OUTPATIENT
Start: 2018-08-06 | End: 2018-08-08

## 2018-08-06 RX ORDER — LIDOCAINE HYDROCHLORIDE AND EPINEPHRINE 10; 10 MG/ML; UG/ML
INJECTION, SOLUTION INFILTRATION; PERINEURAL PRN
Status: DISCONTINUED | OUTPATIENT
Start: 2018-08-06 | End: 2018-08-06 | Stop reason: HOSPADM

## 2018-08-06 RX ORDER — SODIUM CHLORIDE 9 MG/ML
INJECTION, SOLUTION INTRAVENOUS CONTINUOUS
Status: DISCONTINUED | OUTPATIENT
Start: 2018-08-06 | End: 2018-08-07

## 2018-08-06 RX ORDER — SODIUM CHLORIDE 9 MG/ML
INJECTION, SOLUTION INTRAVENOUS CONTINUOUS PRN
Status: DISCONTINUED | OUTPATIENT
Start: 2018-08-06 | End: 2018-08-06 | Stop reason: SDUPTHER

## 2018-08-06 RX ORDER — MIDAZOLAM HYDROCHLORIDE 1 MG/ML
INJECTION INTRAMUSCULAR; INTRAVENOUS PRN
Status: DISCONTINUED | OUTPATIENT
Start: 2018-08-06 | End: 2018-08-06 | Stop reason: SDUPTHER

## 2018-08-06 RX ORDER — ROCURONIUM BROMIDE 10 MG/ML
INJECTION, SOLUTION INTRAVENOUS PRN
Status: DISCONTINUED | OUTPATIENT
Start: 2018-08-06 | End: 2018-08-06 | Stop reason: SDUPTHER

## 2018-08-06 RX ADMIN — SODIUM CHLORIDE: 9 INJECTION, SOLUTION INTRAVENOUS at 07:37

## 2018-08-06 RX ADMIN — FENTANYL CITRATE 150 MCG: 50 INJECTION INTRAMUSCULAR; INTRAVENOUS at 10:25

## 2018-08-06 RX ADMIN — CEFAZOLIN SODIUM 3 G: 10 INJECTION, POWDER, FOR SOLUTION INTRAVENOUS at 21:52

## 2018-08-06 RX ADMIN — SODIUM CHLORIDE: 9 INJECTION, SOLUTION INTRAVENOUS at 13:00

## 2018-08-06 RX ADMIN — ALBUMIN (HUMAN) 12.5 G: 12.5 INJECTION, SOLUTION INTRAVENOUS at 11:58

## 2018-08-06 RX ADMIN — ROCURONIUM BROMIDE 20 MG: 10 INJECTION INTRAVENOUS at 10:20

## 2018-08-06 RX ADMIN — FENTANYL CITRATE 100 MCG: 50 INJECTION INTRAMUSCULAR; INTRAVENOUS at 09:26

## 2018-08-06 RX ADMIN — SODIUM CHLORIDE: 9 INJECTION, SOLUTION INTRAVENOUS at 10:15

## 2018-08-06 RX ADMIN — Medication 4 UNITS: at 17:52

## 2018-08-06 RX ADMIN — Medication 10 MG: at 11:54

## 2018-08-06 RX ADMIN — ROCURONIUM BROMIDE 10 MG: 10 INJECTION INTRAVENOUS at 09:34

## 2018-08-06 RX ADMIN — Medication 140 MG: at 09:34

## 2018-08-06 RX ADMIN — HYDROMORPHONE HYDROCHLORIDE 0.4 MG: 2 INJECTION INTRAMUSCULAR; INTRAVENOUS; SUBCUTANEOUS at 13:29

## 2018-08-06 RX ADMIN — SODIUM CHLORIDE: 9 INJECTION, SOLUTION INTRAVENOUS at 21:31

## 2018-08-06 RX ADMIN — HYDRALAZINE HYDROCHLORIDE 25 MG: 25 TABLET, FILM COATED ORAL at 21:52

## 2018-08-06 RX ADMIN — ROCURONIUM BROMIDE 20 MG: 10 INJECTION INTRAVENOUS at 09:40

## 2018-08-06 RX ADMIN — Medication 10 MG: at 11:02

## 2018-08-06 RX ADMIN — CLONIDINE HYDROCHLORIDE 0.3 MG: 0.2 TABLET ORAL at 21:52

## 2018-08-06 RX ADMIN — SIMVASTATIN 40 MG: 40 TABLET, FILM COATED ORAL at 21:52

## 2018-08-06 RX ADMIN — OXYCODONE HYDROCHLORIDE 10 MG: 10 TABLET, FILM COATED, EXTENDED RELEASE ORAL at 21:52

## 2018-08-06 RX ADMIN — ROCURONIUM BROMIDE 10 MG: 10 INJECTION INTRAVENOUS at 10:56

## 2018-08-06 RX ADMIN — CARVEDILOL 12.5 MG: 6.25 TABLET, FILM COATED ORAL at 17:44

## 2018-08-06 RX ADMIN — OXYCODONE HYDROCHLORIDE AND ACETAMINOPHEN 2 TABLET: 5; 325 TABLET ORAL at 16:25

## 2018-08-06 RX ADMIN — OXYBUTYNIN CHLORIDE 10 MG: 10 TABLET, EXTENDED RELEASE ORAL at 17:45

## 2018-08-06 RX ADMIN — ROCURONIUM BROMIDE 20 MG: 10 INJECTION INTRAVENOUS at 10:05

## 2018-08-06 RX ADMIN — SODIUM CHLORIDE: 9 INJECTION, SOLUTION INTRAVENOUS at 11:20

## 2018-08-06 RX ADMIN — Medication 10 MG: at 12:25

## 2018-08-06 RX ADMIN — Medication 10 MG: at 12:55

## 2018-08-06 RX ADMIN — INSULIN LISPRO 1 UNITS: 100 INJECTION, SOLUTION INTRAVENOUS; SUBCUTANEOUS at 21:53

## 2018-08-06 RX ADMIN — ONDANSETRON 4 MG: 2 INJECTION INTRAMUSCULAR; INTRAVENOUS at 14:52

## 2018-08-06 RX ADMIN — PROPOFOL 120 MG: 10 INJECTION, EMULSION INTRAVENOUS at 09:34

## 2018-08-06 RX ADMIN — HYDROMORPHONE HYDROCHLORIDE 0.4 MG: 2 INJECTION INTRAMUSCULAR; INTRAVENOUS; SUBCUTANEOUS at 13:30

## 2018-08-06 RX ADMIN — INSULIN LISPRO 30 UNITS: 100 INJECTION, SOLUTION INTRAVENOUS; SUBCUTANEOUS at 17:50

## 2018-08-06 RX ADMIN — INSULIN GLARGINE 44 UNITS: 100 INJECTION, SOLUTION SUBCUTANEOUS at 21:51

## 2018-08-06 RX ADMIN — MIDAZOLAM HYDROCHLORIDE 2 MG: 1 INJECTION, SOLUTION INTRAMUSCULAR; INTRAVENOUS at 09:25

## 2018-08-06 RX ADMIN — Medication 10 ML: at 22:01

## 2018-08-06 RX ADMIN — PROPOFOL 30 MG: 10 INJECTION, EMULSION INTRAVENOUS at 10:05

## 2018-08-06 RX ADMIN — CLONIDINE HYDROCHLORIDE 0.3 MG: 0.2 TABLET ORAL at 17:42

## 2018-08-06 RX ADMIN — DOCUSATE SODIUM 100 MG: 100 CAPSULE, LIQUID FILLED ORAL at 21:52

## 2018-08-06 RX ADMIN — ALLOPURINOL 100 MG: 100 TABLET ORAL at 17:42

## 2018-08-06 RX ADMIN — Medication 3 G: at 09:40

## 2018-08-06 RX ADMIN — PHENYLEPHRINE HYDROCHLORIDE 50 MCG: 10 INJECTION INTRAVENOUS at 10:50

## 2018-08-06 RX ADMIN — PHENYLEPHRINE HYDROCHLORIDE 50 MCG: 10 INJECTION INTRAVENOUS at 10:39

## 2018-08-06 ASSESSMENT — PULMONARY FUNCTION TESTS
PIF_VALUE: 24
PIF_VALUE: 27
PIF_VALUE: 3
PIF_VALUE: 27
PIF_VALUE: 19
PIF_VALUE: 26
PIF_VALUE: 18
PIF_VALUE: 19
PIF_VALUE: 27
PIF_VALUE: 26
PIF_VALUE: 26
PIF_VALUE: 1
PIF_VALUE: 2
PIF_VALUE: 19
PIF_VALUE: 21
PIF_VALUE: 26
PIF_VALUE: 2
PIF_VALUE: 24
PIF_VALUE: 2
PIF_VALUE: 19
PIF_VALUE: 27
PIF_VALUE: 26
PIF_VALUE: 27
PIF_VALUE: 26
PIF_VALUE: 25
PIF_VALUE: 26
PIF_VALUE: 20
PIF_VALUE: 19
PIF_VALUE: 18
PIF_VALUE: 27
PIF_VALUE: 26
PIF_VALUE: 26
PIF_VALUE: 31
PIF_VALUE: 26
PIF_VALUE: 2
PIF_VALUE: 18
PIF_VALUE: 26
PIF_VALUE: 29
PIF_VALUE: 28
PIF_VALUE: 25
PIF_VALUE: 24
PIF_VALUE: 25
PIF_VALUE: 27
PIF_VALUE: 26
PIF_VALUE: 19
PIF_VALUE: 26
PIF_VALUE: 18
PIF_VALUE: 26
PIF_VALUE: 27
PIF_VALUE: 20
PIF_VALUE: 20
PIF_VALUE: 27
PIF_VALUE: 19
PIF_VALUE: 27
PIF_VALUE: 19
PIF_VALUE: 27
PIF_VALUE: 2
PIF_VALUE: 26
PIF_VALUE: 25
PIF_VALUE: 26
PIF_VALUE: 27
PIF_VALUE: 2
PIF_VALUE: 24
PIF_VALUE: 26
PIF_VALUE: 26
PIF_VALUE: 27
PIF_VALUE: 24
PIF_VALUE: 18
PIF_VALUE: 24
PIF_VALUE: 26
PIF_VALUE: 19
PIF_VALUE: 26
PIF_VALUE: 27
PIF_VALUE: 2
PIF_VALUE: 26
PIF_VALUE: 2
PIF_VALUE: 29
PIF_VALUE: 20
PIF_VALUE: 26
PIF_VALUE: 2
PIF_VALUE: 26
PIF_VALUE: 25
PIF_VALUE: 27
PIF_VALUE: 26
PIF_VALUE: 26
PIF_VALUE: 2
PIF_VALUE: 18
PIF_VALUE: 26
PIF_VALUE: 2
PIF_VALUE: 26
PIF_VALUE: 27
PIF_VALUE: 26
PIF_VALUE: 27
PIF_VALUE: 25
PIF_VALUE: 8
PIF_VALUE: 25
PIF_VALUE: 25
PIF_VALUE: 29
PIF_VALUE: 19
PIF_VALUE: 19
PIF_VALUE: 26
PIF_VALUE: 27
PIF_VALUE: 24
PIF_VALUE: 20
PIF_VALUE: 19
PIF_VALUE: 23
PIF_VALUE: 26
PIF_VALUE: 24
PIF_VALUE: 18
PIF_VALUE: 26
PIF_VALUE: 24
PIF_VALUE: 27
PIF_VALUE: 2
PIF_VALUE: 20
PIF_VALUE: 20
PIF_VALUE: 27
PIF_VALUE: 26
PIF_VALUE: 2
PIF_VALUE: 26
PIF_VALUE: 20
PIF_VALUE: 26
PIF_VALUE: 26
PIF_VALUE: 24
PIF_VALUE: 26
PIF_VALUE: 20
PIF_VALUE: 20
PIF_VALUE: 27
PIF_VALUE: 20
PIF_VALUE: 27
PIF_VALUE: 26
PIF_VALUE: 19
PIF_VALUE: 26
PIF_VALUE: 27
PIF_VALUE: 26
PIF_VALUE: 2
PIF_VALUE: 27
PIF_VALUE: 26
PIF_VALUE: 26
PIF_VALUE: 24
PIF_VALUE: 27
PIF_VALUE: 26
PIF_VALUE: 27
PIF_VALUE: 24
PIF_VALUE: 19
PIF_VALUE: 27
PIF_VALUE: 1
PIF_VALUE: 18
PIF_VALUE: 2
PIF_VALUE: 18
PIF_VALUE: 26
PIF_VALUE: 25
PIF_VALUE: 27
PIF_VALUE: 26
PIF_VALUE: 19
PIF_VALUE: 24
PIF_VALUE: 26
PIF_VALUE: 19
PIF_VALUE: 26
PIF_VALUE: 19
PIF_VALUE: 26
PIF_VALUE: 3
PIF_VALUE: 26
PIF_VALUE: 26
PIF_VALUE: 2
PIF_VALUE: 29
PIF_VALUE: 2
PIF_VALUE: 23
PIF_VALUE: 25
PIF_VALUE: 1
PIF_VALUE: 27
PIF_VALUE: 2
PIF_VALUE: 26
PIF_VALUE: 26
PIF_VALUE: 27
PIF_VALUE: 24
PIF_VALUE: 24
PIF_VALUE: 26
PIF_VALUE: 2
PIF_VALUE: 27
PIF_VALUE: 26
PIF_VALUE: 1
PIF_VALUE: 19
PIF_VALUE: 24
PIF_VALUE: 26
PIF_VALUE: 25
PIF_VALUE: 27
PIF_VALUE: 26
PIF_VALUE: 27
PIF_VALUE: 19
PIF_VALUE: 26
PIF_VALUE: 26
PIF_VALUE: 24
PIF_VALUE: 2
PIF_VALUE: 26
PIF_VALUE: 18
PIF_VALUE: 26
PIF_VALUE: 19
PIF_VALUE: 18
PIF_VALUE: 1
PIF_VALUE: 1
PIF_VALUE: 19
PIF_VALUE: 16
PIF_VALUE: 19
PIF_VALUE: 2
PIF_VALUE: 25
PIF_VALUE: 18
PIF_VALUE: 18
PIF_VALUE: 19
PIF_VALUE: 19
PIF_VALUE: 10
PIF_VALUE: 2
PIF_VALUE: 27
PIF_VALUE: 25
PIF_VALUE: 27
PIF_VALUE: 2
PIF_VALUE: 24
PIF_VALUE: 27
PIF_VALUE: 1
PIF_VALUE: 29
PIF_VALUE: 26
PIF_VALUE: 24
PIF_VALUE: 26
PIF_VALUE: 28
PIF_VALUE: 24
PIF_VALUE: 26
PIF_VALUE: 26
PIF_VALUE: 2
PIF_VALUE: 27
PIF_VALUE: 20
PIF_VALUE: 24
PIF_VALUE: 23
PIF_VALUE: 19
PIF_VALUE: 27
PIF_VALUE: 24
PIF_VALUE: 27
PIF_VALUE: 19
PIF_VALUE: 2
PIF_VALUE: 26
PIF_VALUE: 2
PIF_VALUE: 26
PIF_VALUE: 26
PIF_VALUE: 18
PIF_VALUE: 26
PIF_VALUE: 19
PIF_VALUE: 24
PIF_VALUE: 26
PIF_VALUE: 18
PIF_VALUE: 18
PIF_VALUE: 26
PIF_VALUE: 2
PIF_VALUE: 2
PIF_VALUE: 26
PIF_VALUE: 18
PIF_VALUE: 2
PIF_VALUE: 28
PIF_VALUE: 2
PIF_VALUE: 20
PIF_VALUE: 2
PIF_VALUE: 27
PIF_VALUE: 26
PIF_VALUE: 2
PIF_VALUE: 25

## 2018-08-06 ASSESSMENT — PAIN DESCRIPTION - LOCATION: LOCATION: BACK

## 2018-08-06 ASSESSMENT — PAIN SCALES - GENERAL
PAINLEVEL_OUTOF10: 4
PAINLEVEL_OUTOF10: 10
PAINLEVEL_OUTOF10: 10
PAINLEVEL_OUTOF10: 8

## 2018-08-06 ASSESSMENT — PAIN DESCRIPTION - PAIN TYPE: TYPE: SURGICAL PAIN

## 2018-08-06 NOTE — ANESTHESIA PRE PROCEDURE
Department of Anesthesiology  Preprocedure Note       Name:  Arlin Askew   Age:  66 y.o.  :  1940                                          MRN:  [id number]         Date:  2018      Surgeon: Orquidea Guardado):  Gisella Manzo MD    Procedure: Procedure(s):  REVISION LAMINECTOMY, LUMBAR LAMINECTOMY WITH PSF L3-S1, PLIF L5-S1 WITH SOLERA 5.5/CAPSTONE AND TRENT DBM LOCAL BONE GRAFTING VS IC[facility]    Medications prior to admission:   Prior to Admission medications    Medication Sig Start Date End Date Taking? Authorizing Provider   Specialty Vitamins Products (PROSTATE PO) Take by mouth every morning   Yes Historical Provider, MD   traMADol (ULTRAM) 50 MG tablet Take 50 mg by mouth every 6 hours as needed for Pain. .   Yes Historical Provider, MD   hydrALAZINE (APRESOLINE) 25 MG tablet Take 25 mg by mouth 2 times daily   Yes Historical Provider, MD   oxybutynin (DITROPAN-XL) 10 MG extended release tablet TAKE ONE TABLET BY MOUTH DAILY 17  Yes Connie Tyler MD   allopurinol (ZYLOPRIM) 100 MG tablet Take 100 mg by mouth daily   Yes Historical Provider, MD   carvedilol (COREG) 12.5 MG tablet Take 12.5 mg by mouth 2 times daily    Yes Historical Provider, MD   cloNIDine (CATAPRES) 0.3 MG tablet Take 0.3 mg by mouth 3 times daily    Yes Historical Provider, MD   insulin lispro (HUMALOG KWIKPEN) 100 UNIT/ML injection Inject 30 Units into the skin 3 times daily (before meals)    Yes Historical Provider, MD   insulin glargine (LANTUS SOLOSTAR) 100 UNIT/ML injection Inject 44 Units into the skin nightly    Yes Historical Provider, MD   simvastatin (ZOCOR) 40 MG tablet Take 40 mg by mouth nightly. Yes Historical Provider, MD   aspirin 81 MG tablet Take 81 mg by mouth daily.     Historical Provider, MD       Current medications:    Current Facility-Administered Medications   Medication Dose Route Frequency Provider Last Rate Last Dose    0.9 % sodium chloride infusion   Intravenous Continuous Jaden Montanez PA-C 125 mL/hr at 08/06/18 0737      ceFAZolin (ANCEF) 3 g in dextrose 5 % 100 mL IVPB  3 g Intravenous On Call to 31 Tia Cooper PA-C        0.9 % sodium chloride bolus  250 mL Intravenous Once Jaden Montanez PA-C           Allergies:     Allergies   Allergen Reactions    Tape Tiago Sauger Tape] Rash       Problem List:    Patient Active Problem List   Diagnosis Code    Type II or unspecified type diabetes mellitus without mention of complication, not stated as uncontrolled E11.9    Chronic kidney disease, stage III (moderate) N18.3    Hypertension I10    Proteinuria R80.9    Vitamin D deficiency E55.9    Anemia in chronic renal disease N18.9, D63.1    Diabetes mellitus with renal manifestation ([facility] [address] E11.29    ADELINA (acute kidney injury) ([facility] [address] N17.9    CKD (chronic kidney disease), stage III N18.3    Anemia of chronic disease A86.7    Metabolic acidosis J36.4    Spinal stenosis of lumbar region with neurogenic claudication M48.062       Past Medical History:        Diagnosis Date    Anemia in chronic kidney disease(285.21)     Arthritis     Chronic kidney disease, stage III (moderate)     Stage IIIB    Hematuria June 2014    Hyperlipidemia     Hypertension     Proteinuria     Sleep apnea     doesn't wear CPAP    Type II or unspecified type diabetes mellitus without mention of complication, not stated as uncontrolled     Vitamin D deficiency     Wears dentures     Wears glasses     Wears hearing aid     bilateral       Past Surgical History:        Procedure Laterality Date    APPENDECTOMY  1961    BLEPHAROPLASTY Bilateral     [facility]    CARPAL TUNNEL RELEASE Left 01/2013    COLONOSCOPY  2011, 2012,2017    DENTAL SURGERY      full mouth extraction    OTHER SURGICAL HISTORY      piece of steel removed left hand    OTHER SURGICAL HISTORY      goiter removed    OTHER SURGICAL HISTORY  5/30/14    arteriogram     ROTATOR CUFF REPAIR Left     UPPER GASTROINTESTINAL ENDOSCOPY  2017       Social History:    Social History   Substance Use Topics    Smoking status: Former Smoker     Types: Cigarettes, Cigars     Quit date: 3/22/2006    Smokeless tobacco: Never Used    Alcohol use No                                Counseling given: Not Answered      Vital Signs (Current):   Vitals:    08/06/18 0647   BP: (!) 148/67   Pulse: 54   Resp: 17   Temp: 98.5 °F (36.9 °C)   TempSrc: Temporal   SpO2: 98%   Weight: 277 lb (125.6 kg)                                              BP Readings from Last 3 Encounters:   08/06/18 (!) 148/67   07/10/18 (!) 142/67   07/13/18 130/64       NPO Status: Time of last liquid consumption: 1900                        Time of last solid consumption: 1900                        Date of last liquid consumption: 08/05/18                        Date of last solid food consumption: 08/05/18    BMI:   Wt Readings from Last 3 Encounters:   08/06/18 277 lb (125.6 kg)   07/10/18 275 lb 9.2 oz (125 kg)   07/13/18 275 lb (124.7 kg)     Body mass index is 38.63 kg/m². CBC:   Lab Results   Component Value Date    WBC 9.3 07/10/2018    RBC 4.10 07/10/2018    HGB 10.6 07/10/2018    HCT 34.0 07/10/2018    MCV 82.9 07/10/2018    RDW 14.3 07/25/2014     07/10/2018       CMP:   Lab Results   Component Value Date     07/10/2018    K 4.8 07/10/2018     07/10/2018    CO2 21 07/10/2018    BUN 36 07/10/2018    CREATININE 1.9 07/10/2018    LABGLOM 34 07/10/2018    GLUCOSE 258 07/10/2018    CALCIUM 8.9 07/10/2018    BILITOT 0.2 01/17/2017    ALKPHOS 128 01/17/2017    AST 16 01/17/2017    ALT 18 01/17/2017       POC Tests: No results for input(s): POCGLU, POCNA, POCK, POCCL, POCBUN, POCHEMO, POCHCT in the last 72 hours.     Coags:   Lab Results   Component Value Date    INR 0.98 07/25/2014    APTT 29.1 07/03/2014       HCG (If Applicable): No results found for: PREGTESTUR, PREGSERUM, HCG, HCGQUANT     ABGs: No results found for: PHART, PO2ART, RGU8FVR, GBX9UCW,

## 2018-08-06 NOTE — H&P
I have reviewed the history and physical and examined the patient. I find no relevant changes. I have reviewed with the patient and/or family members, during the preoperative office visit the risks, benefits, and alternatives to the procedure.     Sonu Fairchild

## 2018-08-06 NOTE — PROGRESS NOTES
Department of Orthopedic Surgery  Spine Service  Attending Progress Note        Subjective:  Patient reports back pain but denies any symptoms in the lower extremity at this time. Patient was resting in bed in the ICU floor. Vitals  VITALS:  BP (!) 156/60   Pulse 63   Temp 99.9 °F (37.7 °C) (Temporal)   Resp 14   Wt 277 lb (125.6 kg)   SpO2 98%   BMI 38.63 kg/m²   24HR INTAKE/OUTPUT:    Intake/Output Summary (Last 24 hours) at 08/06/18 1646  Last data filed at 08/06/18 1453   Gross per 24 hour   Intake             4310 ml   Output             1930 ml   Net             2380 ml     URINARY CATHETER OUTPUT (Brunson):  Urethral Catheter Non-latex 16 fr-Output (mL): 250 mL  DRAIN/TUBE OUTPUT:  Closed/Suction Drain Left Back Accordion-Output (ml): 50 ml  Closed/Suction Drain Right Back-Output (ml): 30 ml      PHYSICAL EXAM:    Orientation:  alert and oriented to person, place and time    Incision:  dressing in place, clean, dry, intact    Lower Extremity Motor :  quadriceps, extensor hallucis longus, dorsiflexion, plantarflexion 5/5 bilaterally  Lower Extremity Sensory:  Intact L1-S1    Flatus:  negative    ABNORMAL EXAM FINDINGS:  none    LABS:    HgB:    Lab Results   Component Value Date    HGB 10.6 07/10/2018       ASSESSMENT AND PLAN:      1:  Monitor labs and drain output  2:  Discharge Planning:  Patient will be monitored in the ICU over night and depending on how he is doing may be transferred to the [address] [place] [address] floor tomorrow. Drain stays in place in the lumbar spine. Continue monitoring patient progress.

## 2018-08-06 NOTE — PROGRESS NOTES
1404- Pt to PACU, pt hooked up to monitor, VSS, pt sleepy oral airway in place with mask on, Mario SHEARER at bedside for report and Kiana Guillen RN placed arm bands back on pt.   1421- Dr Jett Rice at bedside. Updated  no orders for coverage  1422- Pt has moderate and equal bilateral pedal push and pull no numbness or tingling. 1425- Oral airway removed simple mask off and pt placed on nasal canula at 4L  1428- Pt states hes got to get up, pt eyes not even open pt educated he has a alvarado. 1438-Pt sore wants to get up. [id number]- Pt turned and repositioned back sore pt settles back in down after turning pt still sleepy but arouses easily  1440- Report called to [address] [place] [address]  21 - Family updated in waiting room. 1452- As unhooking pt became nauseated pt medicated with 4 mg of Zofran due to pt drowsiness  1453- Pt meets discharge criteria.     Pt transported to floor via [facility] [address]

## 2018-08-06 NOTE — BRIEF OP NOTE
Brief Postoperative Note  ______________________________________________________________    Patient: Coretta Nichols  YOB: 1940  MRN: [id number]  Date of Procedure: 8/6/2018    Pre-Op Diagnosis: SPINAL STENOSIS OF LUMBAR REGION WITH NEUROGENIC CLAUDICATION    Post-Op Diagnosis: Same       Procedure(s):  REVISION LAMINECTOMY, LUMBAR LAMINECTOMY WITH PSF L3-S1,  WITH SOLERA 5.5 AND TRENT DBM LOCAL BONE GRAFTING    Anesthesia: General    Surgeon(s):  Esperanza Amaro MD    Staff:  First Assistant: BRAYDEN Reyez Scrub: Rohini Carrion  Scrub Person First: Sydney Payne  Scrub Person Second: Germain Pastrana  Physician Assistant: Jermaine Martin PA-C; Seth Bush PA-C     Estimated Blood Loss: 1000 (units unknown) mL    Complications: None    Specimens:   * No specimens in log *    Implants:    Implant Name Type Inv.  Item Serial No.  Lot No. LRB No. Used   SCREW PEDICAL 5.5 SOLERA [id number] Spine SCREW PEDICAL 5.5 SOLERA [id number]  [employer] [place] [employer]  N/A 2   SCREW PEDICAL 5.5 SOLERA [id number] Spine SCREW PEDICAL 5.5 SOLERA [id number]  [employer] [employer]  N/A 6   SCREW SOLERA BREAKOFF Spine SCREW SOLERA BREAKOFF  [employer] [employer]  N/A 8   IMPL SPINE [place] PRE-CUT 5.5 SOLERA COCR 90MM Spine IMPL SPINE [place] PRE-CUT 5.5 SOLERA COCR [id number] [facility]  N/A 2   RICH-GRAFT MATRX DEMINRL 2.5X10CM - [id number] Bone/Graft/Tissue RICH-GRAFT MATRX DEMINRL 2.5X10CM [id number] [employer] [employer]   N/A 1         Drains:   Urethral Catheter Non-latex 16 fr (Active)       Findings: same    Esperanza Amaro MD  Date: 8/6/2018  Time: 1:26 PM

## 2018-08-06 NOTE — H&P
[address] [place] [address]                         PREOPERATIVE HISTORY AND PHYSICAL    PATIENT NAME: Henrique Dimas                  :        1940  MED REC NO:   [id number]                           ROOM:  ACCOUNT NO:   [de-identified]                           ADMIT DATE: 07/10/2018  PROVIDER:     Viji Spicer PA-C dictating for Mecca Queen MD    CHIEF COMPLAINT:  Back pain and leg radiculopathy. HISTORY OF PRESENT ILLNESS:  The patient is 68years of age presented to  the office for history and physical examination before undergoing surgical  management at this time. The patient has history of previous surgery with  lumbar spine decompressive laminectomy of L3 to L5 on 2017. The  patient presented with symptoms of back pain and right side hip and leg  pain that remained unimproved and are worsening, giving him tremendous  difficulty with standing and walking, and he is now becoming much less  mobile. He felt well for a while after his most recent operation with  return of symptoms that occurred sometime this past winter. He was well  until that time but notes the pain has now increased to the point he does  not want to walk. The pain shots across the posterior aspect of the  buttock, right lateral thigh and right middle shin. The patient has been  to a treatment of lower back exercises and the right extremity, as well as  narcotic pain medications which have failed to improve the patient's  symptoms at this time. The patient denies any loss of urine and stool  control at this time. The patient also complained of lower extremity  fatigue and finds himself leaning on cart when he is shopping in the store. ALLERGIES:  No known drug allergies. PAST MEDICAL HISTORY:  Diabetes, hypertension and sleep apnea.     PAST SURGICAL HISTORY:  Vein stripping, appendectomy in 9, left carpal  tunnel release in 2012, right cubital and carpal tunnel release on  10/24/2014, foreign body excision left hand, left shoulder arthroscopy,  open rotator cuff repair, excision of the distal clavicle on 04/27/2015,  L3-L5 laminectomy on 03/24/2017. MEDICATIONS:  Include clonidine, Coreg, simvastatin, Lantus, Solesta,  tramadol, allopurinol, duloxetine, oxybutynin, hydralazine. The patient  has stopped aspirin and vitamin D on 07/31/2018. The patient also is  taking Lyrica. The patient has also stopped garlic oil and vitamin N13 and  vitamin E.    SOCIAL HISTORY:  Include nonsmoker. Does not drink alcohol. Does not  exercise regularly. Patient reside with child and with grandchild at the private resident. Retired and . REVIEW OF SYSTEMS:  Back pain, radicular leg pain, lower extremity fatigue. PHYSICAL EXAMINATION:  VITAL SIGNS:  Height is 5 feet 11 inches tall, weight is 275 pounds. BMI  is 38.35. HEART:  Rate and rhythm are regular. LUNGS:  Clear to auscultation bilateral.  ABDOMEN:  Soft and nontender. EXTREMITIES:  Bilateral lower extremity, he shows 5/5 strength with his  resisted pedal push and pull, great toe extension and right knee extension  bilaterally. All dermatome sensation is intact to light touch as well. Pulses at the ankle are strong and posterior tibial and dorsalis pedis  bilaterally. MRI imaging demonstrating evidence of wide decompressive laminectomy across  L3, L4 and L5 with stenosis of L3-L4 and L4-L5 recesses and foraminal  bilaterally and stenosis of the L5-S1 foramina bilateral.    SA screen RT-PCR is negative. Hemoglobin A1c is 8.2%. IMPRESSION:  1. Lumbar radicular pain. 2.  Degenerative disk disease. 3.  Lumbar stenosis with neurogenic claudication. PLAN:  The patient will be undergoing revision lumbar spine L3-S1 and  posterior lateral fusion with L5-S1 TLIF instrumentation and allograft  local bone and INFUSE BMP.   He will be

## 2018-08-07 PROBLEM — E66.01 MORBID OBESITY (HCC): Status: ACTIVE | Noted: 2018-08-07

## 2018-08-07 PROBLEM — E78.2 MIXED HYPERLIPIDEMIA: Status: ACTIVE | Noted: 2018-08-07

## 2018-08-07 LAB
ANION GAP SERPL CALCULATED.3IONS-SCNC: 11 MEQ/L (ref 8–16)
BASOPHILS # BLD: 0.3 %
BASOPHILS ABSOLUTE: 0 THOU/MM3 (ref 0–0.1)
BUN BLDV-MCNC: 41 MG/DL (ref 7–22)
CALCIUM SERPL-MCNC: 7.7 MG/DL (ref 8.5–10.5)
CHLORIDE BLD-SCNC: 106 MEQ/L (ref 98–111)
CO2: 20 MEQ/L (ref 23–33)
CREAT SERPL-MCNC: 2.6 MG/DL (ref 0.4–1.2)
EOSINOPHIL # BLD: 0.4 %
EOSINOPHILS ABSOLUTE: 0 THOU/MM3 (ref 0–0.4)
ERYTHROCYTE [DISTWIDTH] IN BLOOD BY AUTOMATED COUNT: 14.3 % (ref 11.5–14.5)
ERYTHROCYTE [DISTWIDTH] IN BLOOD BY AUTOMATED COUNT: 43.1 FL (ref 35–45)
GFR SERPL CREATININE-BSD FRML MDRD: 24 ML/MIN/1.73M2
GLUCOSE BLD-MCNC: 104 MG/DL (ref 70–108)
GLUCOSE BLD-MCNC: 118 MG/DL (ref 70–108)
GLUCOSE BLD-MCNC: 180 MG/DL (ref 70–108)
GLUCOSE BLD-MCNC: 70 MG/DL (ref 70–108)
GLUCOSE BLD-MCNC: 99 MG/DL (ref 70–108)
HCT VFR BLD CALC: 28.6 % (ref 42–52)
HEMOGLOBIN: 9.1 GM/DL (ref 14–18)
IMMATURE GRANS (ABS): 0.04 THOU/MM3 (ref 0–0.07)
IMMATURE GRANULOCYTES: 0.3 %
LYMPHOCYTES # BLD: 10.3 %
LYMPHOCYTES ABSOLUTE: 1.3 THOU/MM3 (ref 1–4.8)
MCH RBC QN AUTO: 26.5 PG (ref 26–33)
MCHC RBC AUTO-ENTMCNC: 31.8 GM/DL (ref 32.2–35.5)
MCV RBC AUTO: 83.1 FL (ref 80–94)
MONOCYTES # BLD: 10.5 %
MONOCYTES ABSOLUTE: 1.3 THOU/MM3 (ref 0.4–1.3)
NUCLEATED RED BLOOD CELLS: 0 /100 WBC
ORGANISM: ABNORMAL
PLATELET # BLD: 215 THOU/MM3 (ref 130–400)
PMV BLD AUTO: 9.1 FL (ref 9.4–12.4)
POTASSIUM SERPL-SCNC: 4.5 MEQ/L (ref 3.5–5.2)
RBC # BLD: 3.44 MILL/MM3 (ref 4.7–6.1)
SEG NEUTROPHILS: 78.2 %
SEGMENTED NEUTROPHILS ABSOLUTE COUNT: 9.5 THOU/MM3 (ref 1.8–7.7)
SODIUM BLD-SCNC: 137 MEQ/L (ref 135–145)
URINE CULTURE, ROUTINE: ABNORMAL
WBC # BLD: 12.2 THOU/MM3 (ref 4.8–10.8)

## 2018-08-07 PROCEDURE — G8988 SELF CARE GOAL STATUS: HCPCS

## 2018-08-07 PROCEDURE — 6370000000 HC RX 637 (ALT 250 FOR IP): Performed by: ORTHOPAEDIC SURGERY

## 2018-08-07 PROCEDURE — G8979 MOBILITY GOAL STATUS: HCPCS

## 2018-08-07 PROCEDURE — 36415 COLL VENOUS BLD VENIPUNCTURE: CPT

## 2018-08-07 PROCEDURE — 1200000000 HC SEMI PRIVATE

## 2018-08-07 PROCEDURE — 99221 1ST HOSP IP/OBS SF/LOW 40: CPT | Performed by: INTERNAL MEDICINE

## 2018-08-07 PROCEDURE — 97162 PT EVAL MOD COMPLEX 30 MIN: CPT

## 2018-08-07 PROCEDURE — 82948 REAGENT STRIP/BLOOD GLUCOSE: CPT

## 2018-08-07 PROCEDURE — 2580000003 HC RX 258: Performed by: ORTHOPAEDIC SURGERY

## 2018-08-07 PROCEDURE — 6360000002 HC RX W HCPCS: Performed by: ORTHOPAEDIC SURGERY

## 2018-08-07 PROCEDURE — 2709999900 HC NON-CHARGEABLE SUPPLY

## 2018-08-07 PROCEDURE — 97116 GAIT TRAINING THERAPY: CPT

## 2018-08-07 PROCEDURE — 80048 BASIC METABOLIC PNL TOTAL CA: CPT

## 2018-08-07 PROCEDURE — 97166 OT EVAL MOD COMPLEX 45 MIN: CPT

## 2018-08-07 PROCEDURE — 97110 THERAPEUTIC EXERCISES: CPT

## 2018-08-07 PROCEDURE — G8987 SELF CARE CURRENT STATUS: HCPCS

## 2018-08-07 PROCEDURE — G8978 MOBILITY CURRENT STATUS: HCPCS

## 2018-08-07 PROCEDURE — 85025 COMPLETE CBC W/AUTO DIFF WBC: CPT

## 2018-08-07 RX ADMIN — DOCUSATE SODIUM 100 MG: 100 CAPSULE, LIQUID FILLED ORAL at 21:14

## 2018-08-07 RX ADMIN — Medication 10 ML: at 08:52

## 2018-08-07 RX ADMIN — INSULIN GLARGINE 44 UNITS: 100 INJECTION, SOLUTION SUBCUTANEOUS at 21:21

## 2018-08-07 RX ADMIN — CARVEDILOL 12.5 MG: 6.25 TABLET, FILM COATED ORAL at 08:51

## 2018-08-07 RX ADMIN — CLONIDINE HYDROCHLORIDE 0.3 MG: 0.2 TABLET ORAL at 21:14

## 2018-08-07 RX ADMIN — HYDRALAZINE HYDROCHLORIDE 25 MG: 25 TABLET, FILM COATED ORAL at 21:14

## 2018-08-07 RX ADMIN — CYCLOBENZAPRINE HYDROCHLORIDE 10 MG: 10 TABLET, FILM COATED ORAL at 02:08

## 2018-08-07 RX ADMIN — OXYBUTYNIN CHLORIDE 10 MG: 10 TABLET, EXTENDED RELEASE ORAL at 08:51

## 2018-08-07 RX ADMIN — Medication 10 ML: at 21:21

## 2018-08-07 RX ADMIN — ALLOPURINOL 100 MG: 100 TABLET ORAL at 08:51

## 2018-08-07 RX ADMIN — OXYCODONE HYDROCHLORIDE AND ACETAMINOPHEN 2 TABLET: 5; 325 TABLET ORAL at 00:58

## 2018-08-07 RX ADMIN — Medication 2 UNITS: at 07:42

## 2018-08-07 RX ADMIN — CLONIDINE HYDROCHLORIDE 0.3 MG: 0.2 TABLET ORAL at 08:53

## 2018-08-07 RX ADMIN — CEFAZOLIN SODIUM 3 G: 10 INJECTION, POWDER, FOR SOLUTION INTRAVENOUS at 04:21

## 2018-08-07 RX ADMIN — CLONIDINE HYDROCHLORIDE 0.3 MG: 0.2 TABLET ORAL at 15:06

## 2018-08-07 RX ADMIN — DOCUSATE SODIUM 100 MG: 100 CAPSULE, LIQUID FILLED ORAL at 08:51

## 2018-08-07 RX ADMIN — INSULIN LISPRO 30 UNITS: 100 INJECTION, SOLUTION INTRAVENOUS; SUBCUTANEOUS at 12:13

## 2018-08-07 RX ADMIN — CARVEDILOL 12.5 MG: 6.25 TABLET, FILM COATED ORAL at 16:37

## 2018-08-07 RX ADMIN — ACETAMINOPHEN 650 MG: 325 TABLET ORAL at 15:03

## 2018-08-07 RX ADMIN — INSULIN LISPRO 30 UNITS: 100 INJECTION, SOLUTION INTRAVENOUS; SUBCUTANEOUS at 16:28

## 2018-08-07 RX ADMIN — OXYCODONE HYDROCHLORIDE AND ACETAMINOPHEN 2 TABLET: 5; 325 TABLET ORAL at 18:20

## 2018-08-07 RX ADMIN — SIMVASTATIN 40 MG: 40 TABLET, FILM COATED ORAL at 21:14

## 2018-08-07 RX ADMIN — HYDRALAZINE HYDROCHLORIDE 25 MG: 25 TABLET, FILM COATED ORAL at 08:51

## 2018-08-07 RX ADMIN — OXYCODONE HYDROCHLORIDE 10 MG: 10 TABLET, FILM COATED, EXTENDED RELEASE ORAL at 08:51

## 2018-08-07 RX ADMIN — INSULIN LISPRO 30 UNITS: 100 INJECTION, SOLUTION INTRAVENOUS; SUBCUTANEOUS at 08:57

## 2018-08-07 RX ADMIN — OXYCODONE HYDROCHLORIDE 10 MG: 10 TABLET, FILM COATED, EXTENDED RELEASE ORAL at 21:14

## 2018-08-07 ASSESSMENT — PAIN SCALES - GENERAL
PAINLEVEL_OUTOF10: 5
PAINLEVEL_OUTOF10: 7
PAINLEVEL_OUTOF10: 3
PAINLEVEL_OUTOF10: 7
PAINLEVEL_OUTOF10: 0
PAINLEVEL_OUTOF10: 3
PAINLEVEL_OUTOF10: 7
PAINLEVEL_OUTOF10: 0
PAINLEVEL_OUTOF10: 5
PAINLEVEL_OUTOF10: 4

## 2018-08-07 ASSESSMENT — PAIN DESCRIPTION - PAIN TYPE
TYPE: SURGICAL PAIN
TYPE: ACUTE PAIN;SURGICAL PAIN
TYPE: ACUTE PAIN;SURGICAL PAIN

## 2018-08-07 ASSESSMENT — PAIN DESCRIPTION - DESCRIPTORS
DESCRIPTORS: ACHING

## 2018-08-07 ASSESSMENT — PAIN DESCRIPTION - ONSET
ONSET: ON-GOING
ONSET: ON-GOING

## 2018-08-07 ASSESSMENT — PAIN DESCRIPTION - LOCATION
LOCATION: BACK

## 2018-08-07 ASSESSMENT — PAIN DESCRIPTION - PROGRESSION
CLINICAL_PROGRESSION: NOT CHANGED

## 2018-08-07 ASSESSMENT — PAIN DESCRIPTION - FREQUENCY
FREQUENCY: CONTINUOUS
FREQUENCY: CONTINUOUS

## 2018-08-07 ASSESSMENT — PAIN DESCRIPTION - ORIENTATION
ORIENTATION: LOWER;MID
ORIENTATION: LOWER;MID

## 2018-08-07 NOTE — PROGRESS NOTES
[address] [address]  INPATIENT PHYSICAL THERAPY  EVALUATION  [facility] ORTHOPEDICS 7K - [id number]    Time In: 3990  Time Out: 0818  Timed Code Treatment Minutes: 8 Minutes  Minutes: 23          Date: 2018  Patient Name: Amaya Woo,  Gender:  male        MRN: [id number]  : 1940  (66 y.o.)      Referring Practitioner: Dr. Aubrie Matthew  Diagnosis: spinal stenosis of lumbar region with neurogenic claudication  Additional Pertinent Hx: admit with above diagnosis, s/p REVISION LAMINECTOMY, LUMBAR LAMINECTOMY WITH PSF L3-S1,  WITH SOLERA 5.5 AND TRENT DBM LOCAL BONE GRAFTING on 18     Past Medical History:   Diagnosis Date    Anemia in chronic kidney disease(285.21)     Arthritis     Chronic kidney disease, stage III (moderate)     Stage IIIB    Hematuria 2014    Hyperlipidemia     Hypertension     Proteinuria     Sleep apnea     doesn't wear CPAP    Type II or unspecified type diabetes mellitus without mention of complication, not stated as uncontrolled     Vitamin D deficiency     Wears dentures     Wears glasses     Wears hearing aid     bilateral     Past Surgical History:   Procedure Laterality Date    APPENDECTOMY      BLEPHAROPLASTY Bilateral     [facility]  [facility]    CARPAL TUNNEL RELEASE Left 2013    COLONOSCOPY  , ,    DENTAL SURGERY      full mouth extraction    OTHER SURGICAL HISTORY      piece of steel removed left hand    OTHER SURGICAL HISTORY      goiter removed    OTHER SURGICAL HISTORY  14    arteriogram     ROTATOR CUFF REPAIR Left     UPPER GASTROINTESTINAL ENDOSCOPY         Restrictions/Precautions:  General Precautions, Fall Risk  Spinal Precautions: No Bending, No Lifting, No Twisting       Subjective:  Chart Reviewed: Yes  Patient assessed for rehabilitation services?: Yes  Family / Caregiver Present: No  Subjective: pleasant and cooperative    General:    Pain:  Yes.   Pain Assessment  Pain Level: 0 goals : home with niece help  Short term goals  Time Frame for Short term goals: 2 weeks  Short term goal 1: bed mobility with MOD I to get in/out of bed  Short term goal 2: transfer with S to get in/out of chairs  Short term goal 3: amb 100'x1 with RW and S to walk safely in home  Short term goal 4: negotiate 3 steps with HR and SBA to enter home safely  Long term goals  Time Frame for Long term goals : no LTGs set secondary to short ELOS    Evaluation Complexity: Based on the findings of patient history, examination, clinical presentation, and decision making during this evaluation, the evaluation of Viktoriya Rose  is of medium complexity. PT G-Codes  Functional Limitation: Mobility: Walking and moving around  Mobility: Walking and Moving Around Current Status (): At least 40 percent but less than 60 percent impaired, limited or restricted  Mobility: Walking and Moving Around Goal Status ():  At least 20 percent but less than 40 percent impaired, limited or restricted       AM-PAC Inpatient Mobility without Stair Climbing Raw Score : 13  AM-PAC Inpatient without Stair Climbing T-Scale Score : 38.96  Mobility Inpatient CMS 0-100% Score: 58.44  Mobility Inpatient without Stair CMS G-Code Modifier : CK

## 2018-08-07 NOTE — OP NOTE
medial facetectomies of levels of L3-L4, L4-L5, and L5-S1 for a  complete relief of the stenosis at the levels of L3, L4, L5, and S1. Superior process facet removal was also performed across levels of L4, L5,  and S1 to decompress the opening foramina very thoroughly through the  L3-L4, L4-L5, and L5-S1 neuroforamina bilateral.    I used the curved curettes, [employer], and [place] rongeurs for  this decompression with a very careful protection of all nerve root  structures and would also decompress the canal within the recess  bilaterally, clearing the canal for the traversing exiting nerve roots  bilateral.  All bone that was taken was also cleared of soft tissue and we  traced out the third, fourth, and fifth nerve root structures and the  traversing S1 nerve root structure in a revision manner bilateral.  We  found no evidence of nerve injury or CSF leak in the course of this  decompression and then set aside a retractor and would proceed to perform  instrumentation with an x-ray being taken that would show appropriate level  of surgery. With the pedicles opened over levels of L3 through S1, each  pedicle would then undergo cannulation, threaded tapping, and palpation to  insert 50-mm screws over levels of L3 through S1 bilateral.  All thresholds  had been measured, measuring better than 20 milliamps throughout. We would also decorticate all bony structures laterally including  transverse process of L3 through L5 and the ala of the sacrum bilateral.   Two 90-mm cobalt chrome rods were selected with appropriate bend, would fit  very well, using four set screws per side as well. With bridging rods in  place, wound irrigated very thoroughly. We also used IrriSept solution  followed by the saline as well with bacitracin saline solution and would  then apply allograft bone [place] DBM and local bone over L3 through S1 for  fusion, back out the retractors, and close in layers with two drains. Intraoperative x-rays were reviewed. This showed well-placed pedicle  screws and bridging rods over L3 through S1. With completion of the  procedure, we took the patient back to recovery post extubation and my  first assistant was The Hubert Schaefer, as well. Due to the patient's body mass index of 38, his operative time was 50% more  than usual as well. Jaden Montanez, [facility], assisted throughout the procedure with positioning,  draping, retraction, wound closure, dressing, and splint application.         Donta Cool M.D.    D: 08/06/2018 14:04:27       T: 08/06/2018 16:48:52     [email or web address]  Job#: [id number]     Doc#: [id number]    CC:

## 2018-08-07 NOTE — PROGRESS NOTES
GASTROINTESTINAL ENDOSCOPY  2017           Subjective  Chart Reviewed: Yes (orders, progress notes)  Patient assessed for rehabilitation services?: Yes  Family / Caregiver Present: No    Subjective: cooperative, slow processing, confusion    General:       Vision: Within Functional Limits    Hearing: Exceptions to [facility]  Hearing Exceptions: Hard of hearing/hearing concerns         Pain:  Pain Assessment  Patient Currently in Pain: Yes (\"not too much\")       Social/Functional History:  Lives With: Alone  Type of Home: House  Home Layout: Two level, Able to Live on Main level with bedroom/bathroom  Home Access: Stairs to enter with rails  Entrance Stairs - Number of Steps: 3  Home Equipment: Rolling walker, Sock aid, Reacher     Bathroom Shower/Tub: Walk-in shower, Shower chair with back  Bathroom Toilet: Standard  Bathroom Equipment: Grab bars in shower, Grab bars around toilet       ADL Assistance: [address] [place] [address] Avenue: Needs assistance (report niece will clean for Pt)       Ambulation Assistance: Independent  Transfer Assistance: Independent          Additional Comments: per pt niece to stay with him at discharge as long as he needs to assist. Pt reports using RW prior to sx.      Objective        Overall Cognitive Status: Exceptions (slow processing, decreased problem solving, decreased insight)         Sensation  Overall Sensation Status: WNL                    LUE AROM (degrees)  LUE AROM : WNL          RUE AROM (degrees)  RUE AROM : WNL       LUE Strength  Gross LUE Strength:  (NT d/t back precautions)           RUE Strength  Gross RUE Strength:  (NT d/t back precautions)         ADL  LE Dressing: Dependent/Total (for adjusting slipper socks)          Transfers  Sit to stand: Minimal assistance (extra time to get ready)  Stand to sit: Contact guard assistance    Balance  Standing Balance: Contact guard assistance (static, vcs for erect posture)           Functional Mobility  Functional - Mobility Device: Rolling Walker  Activity:  (5ft )  Assist Level: Contact guard assistance (-occassional min A for manevering walker)                                 Activity Tolerance:  Activity Tolerance: Treatment limited secondary to decreased cognition, Patient limited by fatigue    Treatment Initiated:  Pt ambulated additional 10ft in room with CGA that progressed to max A x 1-Pt c/o RUE hurting, pushed walker too far ahead & leaned forward further & further. Pt not listening to therapist's safety suggestions \"you have no aurea in me\". Therapist had Pt sit down on rolling office chair to take a break & asked A of another for remaining 5ft to recliner. Sit-stand from office chair with min A. Pt had LOB requiring mod A to correct then finished ambulating to recliner with min A x 1 & CGA x 1. Pt was s/u with lunch tray in front at end of session. Pt reported seeing \"bugs\" on the floor when sitting on office chair-nurse was notified of this. Assessment:  Assessment: Pt demo decreased ADL & functional mobility over PLOF of indep with ADLs. Continued OT recommended to educate Pt on safety & compensatory strategies for eventual safe discharge home. Performance deficits / Impairments: Decreased functional mobility , Decreased ADL status, Decreased safe awareness, Decreased balance, Decreased endurance  Prognosis: Fair  Discharge Recommendations: Subacute/Skilled Nursing Facility    Clinical Decision Making: Clinical Decision making was of Moderate Complexity as the result of analysis of data from a detailed assessment, a consideration of several treatment options, the presence of comorbidities affecting the plan of care and the need for minimal to moderate modifications or assistance required to complete the evaluation. Patient Education:  Patient Education: OT role, POC, safety with mobility, back precautions  Barriers to Learning: decreased cognition    Equipment Recommendations:   Other: Continue to assess pending

## 2018-08-07 NOTE — PLAN OF CARE
Problem: Pain:  Goal: Pain level will decrease  Pain level will decrease   Outcome: Ongoing  Oxy ER and percocet for pain with relief. Pain goal 5/10  Goal: Control of acute pain  Control of acute pain   Outcome: Completed Date Met: 08/07/18    Goal: Control of chronic pain  Control of chronic pain   Outcome: Completed Date Met: 08/07/18      Problem: Musculor/Skeletal Functional Status  Goal: Highest potential functional level  Outcome: Ongoing  Patient remains x 1 assist.     Problem: Infection:  Goal: Will remain free from infection  Will remain free from infection  Outcome: Ongoing  None noted this shift. Low grade temp. Problem: Safety:  Goal: Free from accidental physical injury  Free from accidental physical injury  Outcome: Ongoing  No injury this shift. Gait belt and walker in use. Problem: Daily Care:  Goal: Daily care needs are met  Daily care needs are met  Outcome: Ongoing  Patient participates in adls. Problem: Skin Integrity:  Goal: Skin integrity will stabilize  Skin integrity will stabilize  Outcome: Ongoing  No new skin breakdown noted. Up to chair as tolerated. Problem: Discharge Planning:  Goal: Patients continuum of care needs are met  Patients continuum of care needs are met  Outcome: Ongoing  Discharge to home when stable. Comments: Care plan reviewed with patient and family. Patient and family verbalize understanding of the plan of care and contribute to goal setting.

## 2018-08-07 NOTE — CONSULTS
contain minor errors which are inherent in voice recognition technology. **      Final report electronically signed by Dr. Letty Muñoz on 8/6/2018 1:51 PM      XR Lumbar Spine 1 VW   Final Result   Intraoperative  appearance of the lumbar spine. **This report has been created using voice recognition software. It may contain minor errors which are inherent in voice recognition technology. **      Final report electronically signed by Dr. Letty Muñoz on 8/6/2018 2:05 PM      XR Lumbar Spine 1 VW   Final Result   Intraoperative  appearance of the lumbar spine. **This report has been created using voice recognition software. It may contain minor errors which are inherent in voice recognition technology. **      Final report electronically signed by Dr. Letty Muñoz on 8/6/2018 1:49 PM             EKG:  I have reviewed the EKG with the following interpretation:        DVT prophylaxis: [] Lovenox                                 [x] SCDs                                 [] SQ Heparin                                 [] Encourage ambulation           [] Already on Anticoagulation      Code Status: Full Code    PT/OT Eval Status: Active and ongoing      ASSESSMENT:    C/Barbara Nicole 1106 Problems    Diagnosis Date Noted    Mixed hyperlipidemia [E78.2] 08/07/2018    Morbid obesity ([facility] [address] [E66.01] 08/07/2018    Spinal stenosis of lumbar region with neurogenic claudication [M48.062] 08/06/2018    Diabetes mellitus with renal manifestation ([address] [E11.29] 02/23/2016    Chronic kidney disease, stage III (moderate) [N18.3]     Hypertension [I10]     Anemia in chronic renal disease [N18.9, D63.1]        PLAN:    1. At time of dc resume home medications with out adjustments  2. Recommend cpap  3. Follow up with specialist-renal  4. Carb controlled diet  5. Thank you for the consultation.     Electronically signed by Rufus Rodriguez DO on 8/7/2018 at 7:34 PM

## 2018-08-07 NOTE — CARE COORDINATION
8/7/18, 7:58 AM      Vernell Roberts       Admitted from: Surgery 8/6/2018/ 0611 Hospital day: 1   Location: -08/008-A Reason for admit: Spinal stenosis of lumbar region with neurogenic claudication [M48.062] Status: IP  Admit order signed?: yes  PMH:  has a past medical history of Anemia in chronic kidney disease(285.21); Arthritis; Chronic kidney disease, stage III (moderate); Hematuria; Hyperlipidemia; Hypertension; Proteinuria; Sleep apnea; Type II or unspecified type diabetes mellitus without mention of complication, not stated as uncontrolled; Vitamin D deficiency; Wears dentures; Wears glasses; and Wears hearing aid. Procedure:   8/6 REVISION LAMINECTOMY, LUMBAR LAMINECTOMY WITH PSF L3-S1,  WITH SOLERA 5.5 AND TRENT DBM LOCAL BONE GRAFTING  Pertinent abnormal Imaging:none  Medications:  Scheduled Meds:   insulin lispro  30 Units Subcutaneous TID WC    insulin glargine  44 Units Subcutaneous Nightly    simvastatin  40 mg Oral Nightly    cloNIDine  0.3 mg Oral TID    carvedilol  12.5 mg Oral BID WC    allopurinol  100 mg Oral Daily    oxybutynin  1 tablet Oral Daily    hydrALAZINE  25 mg Oral BID    sodium chloride flush  10 mL Intravenous 2 times per day    docusate sodium  100 mg Oral BID    oxyCODONE  10 mg Oral 2 times per day    insulin lispro  0-12 Units Subcutaneous TID WC    insulin lispro  0-6 Units Subcutaneous Nightly     Continuous Infusions:   sodium chloride 100 mL/hr at 08/06/18 2131    dextrose        Pertinent Info/Orders/Treatment Plan: POD #1. EBL 1000 ml. ICU post op for monitoring. ICU post-op for monitoring. Hemovac x2. Afebrile. On room air. Ox4. I&O, IS, n/v checks, drain care, wound care, SCDs, alvarado care, ambulate. IVF, coreg, prn flexeril, hydralazine, SSI ACHS & lantus, ditropan xl, oxycontin, prn percocet, zocor. IV ATB completed. WBC 12.2, Hgb 9.1. Urine sent for culture.    Diet: DIET CARB CONTROL;   DVT Prophylaxis: SCD's ordered and on  Smoking status: reports that he quit smoking about 12 years ago. His smoking use included Cigarettes and Cigars. He has never used smokeless tobacco.   Influenza Vaccination Screening Completed: n/a  Pneumonia Vaccination Screening Completed: yes, pt refused  PCP: Magalys Euceda MD  Readmission: no  Readmission Risk Score: 10%    Discharge Planning  Current Residence:     Living Arrangements:      Support Systems:     Current Services PTA:     Potential Assistance Needed:     Potential Assistance Purchasing Medications:     Does patient want to participate in local refill/ meds to beds program?     Type of Home Care Services:     Patient expects to be discharged to:     Expected Discharge date: Follow Up Appointment: Best Day/ Time:      Discharge Plan: Spoke with Harlingenbereket Hansonstephany; states he lives at home alone and uses a walker. He did not have any HH services PTA. Explained to Chary Rios that Dr. Lio Ceron PA ordered IP Rehab/TCU and explained what that is. Patient states he doesn't really want to go there, he \"would rather go home and get therapy there, that's what they told me I could do before surgery\". Harlingen Blanca states his niece is coming to stay with him at discharge. He states he would like to have new HH including PT/OT at home. List of [facility] agencies given to Chary Rios.       Evaluation: yes

## 2018-08-07 NOTE — PROGRESS NOTES
Pt sitting upright in bed- no complaints, no distress, report called to  via night shift venkata Stone . [id number] pt walked to wheelchair via physical therapy - then transferred to [facility] in stable condition .  No distress noted

## 2018-08-08 LAB
BACTERIA: ABNORMAL
BASOPHILS # BLD: 0.2 %
BASOPHILS ABSOLUTE: 0 THOU/MM3 (ref 0–0.1)
BILIRUBIN URINE: NEGATIVE
BLOOD, URINE: NEGATIVE
CASTS: ABNORMAL /LPF
CASTS: ABNORMAL /LPF
CHARACTER, URINE: CLEAR
COLOR: YELLOW
CRYSTALS: ABNORMAL
EOSINOPHIL # BLD: 0.7 %
EOSINOPHILS ABSOLUTE: 0.1 THOU/MM3 (ref 0–0.4)
EPITHELIAL CELLS, UA: ABNORMAL /HPF
ERYTHROCYTE [DISTWIDTH] IN BLOOD BY AUTOMATED COUNT: 14.6 % (ref 11.5–14.5)
ERYTHROCYTE [DISTWIDTH] IN BLOOD BY AUTOMATED COUNT: 44.8 FL (ref 35–45)
GLUCOSE BLD-MCNC: 141 MG/DL (ref 70–108)
GLUCOSE BLD-MCNC: 191 MG/DL (ref 70–108)
GLUCOSE BLD-MCNC: 205 MG/DL (ref 70–108)
GLUCOSE BLD-MCNC: 42 MG/DL (ref 70–108)
GLUCOSE BLD-MCNC: 96 MG/DL (ref 70–108)
GLUCOSE, URINE: NEGATIVE MG/DL
HCT VFR BLD CALC: 27.8 % (ref 42–52)
HEMOGLOBIN: 8.7 GM/DL (ref 14–18)
IMMATURE GRANS (ABS): 0.08 THOU/MM3 (ref 0–0.07)
IMMATURE GRANULOCYTES: 0.6 %
KETONES, URINE: NEGATIVE
LEUKOCYTE ESTERASE, URINE: ABNORMAL
LYMPHOCYTES # BLD: 14 %
LYMPHOCYTES ABSOLUTE: 1.8 THOU/MM3 (ref 1–4.8)
MCH RBC QN AUTO: 26.6 PG (ref 26–33)
MCHC RBC AUTO-ENTMCNC: 31.3 GM/DL (ref 32.2–35.5)
MCV RBC AUTO: 85 FL (ref 80–94)
MISCELLANEOUS LAB TEST RESULT: ABNORMAL
MONOCYTES # BLD: 12.4 %
MONOCYTES ABSOLUTE: 1.6 THOU/MM3 (ref 0.4–1.3)
MRSA SCREEN: NORMAL
NITRITE, URINE: NEGATIVE
NUCLEATED RED BLOOD CELLS: 0 /100 WBC
PH UA: 5.5
PLATELET # BLD: 203 THOU/MM3 (ref 130–400)
PMV BLD AUTO: 9.3 FL (ref 9.4–12.4)
PROTEIN UA: NEGATIVE MG/DL
RBC # BLD: 3.27 MILL/MM3 (ref 4.7–6.1)
RBC URINE: ABNORMAL /HPF
RENAL EPITHELIAL, UA: ABNORMAL
SEG NEUTROPHILS: 72.1 %
SEGMENTED NEUTROPHILS ABSOLUTE COUNT: 9.1 THOU/MM3 (ref 1.8–7.7)
SPECIFIC GRAVITY UA: 1.02 (ref 1–1.03)
UROBILINOGEN, URINE: 0.2 EU/DL
VRE CULTURE: NORMAL
WBC # BLD: 12.6 THOU/MM3 (ref 4.8–10.8)
WBC UA: ABNORMAL /HPF
YEAST: ABNORMAL

## 2018-08-08 PROCEDURE — 97535 SELF CARE MNGMENT TRAINING: CPT

## 2018-08-08 PROCEDURE — 2580000003 HC RX 258: Performed by: ORTHOPAEDIC SURGERY

## 2018-08-08 PROCEDURE — 81001 URINALYSIS AUTO W/SCOPE: CPT

## 2018-08-08 PROCEDURE — 97116 GAIT TRAINING THERAPY: CPT

## 2018-08-08 PROCEDURE — 6370000000 HC RX 637 (ALT 250 FOR IP): Performed by: ORTHOPAEDIC SURGERY

## 2018-08-08 PROCEDURE — 82948 REAGENT STRIP/BLOOD GLUCOSE: CPT

## 2018-08-08 PROCEDURE — 97110 THERAPEUTIC EXERCISES: CPT

## 2018-08-08 PROCEDURE — 6370000000 HC RX 637 (ALT 250 FOR IP): Performed by: PHYSICIAN ASSISTANT

## 2018-08-08 PROCEDURE — 99232 SBSQ HOSP IP/OBS MODERATE 35: CPT | Performed by: INTERNAL MEDICINE

## 2018-08-08 PROCEDURE — 85025 COMPLETE CBC W/AUTO DIFF WBC: CPT

## 2018-08-08 PROCEDURE — 36415 COLL VENOUS BLD VENIPUNCTURE: CPT

## 2018-08-08 PROCEDURE — 1200000000 HC SEMI PRIVATE

## 2018-08-08 RX ORDER — HYDRALAZINE HYDROCHLORIDE 25 MG/1
25 TABLET, FILM COATED ORAL EVERY 8 HOURS SCHEDULED
Status: DISCONTINUED | OUTPATIENT
Start: 2018-08-08 | End: 2018-08-12 | Stop reason: HOSPADM

## 2018-08-08 RX ORDER — POLYETHYLENE GLYCOL 3350 17 G/17G
17 POWDER, FOR SOLUTION ORAL DAILY
Status: DISCONTINUED | OUTPATIENT
Start: 2018-08-08 | End: 2018-08-12 | Stop reason: HOSPADM

## 2018-08-08 RX ADMIN — OXYBUTYNIN CHLORIDE 10 MG: 10 TABLET, EXTENDED RELEASE ORAL at 08:50

## 2018-08-08 RX ADMIN — INSULIN GLARGINE 44 UNITS: 100 INJECTION, SOLUTION SUBCUTANEOUS at 21:47

## 2018-08-08 RX ADMIN — DOCUSATE SODIUM 100 MG: 100 CAPSULE, LIQUID FILLED ORAL at 19:58

## 2018-08-08 RX ADMIN — SIMVASTATIN 40 MG: 40 TABLET, FILM COATED ORAL at 19:58

## 2018-08-08 RX ADMIN — OXYCODONE HYDROCHLORIDE AND ACETAMINOPHEN 1 TABLET: 5; 325 TABLET ORAL at 06:46

## 2018-08-08 RX ADMIN — INSULIN LISPRO 30 UNITS: 100 INJECTION, SOLUTION INTRAVENOUS; SUBCUTANEOUS at 11:38

## 2018-08-08 RX ADMIN — HYDRALAZINE HYDROCHLORIDE 25 MG: 25 TABLET, FILM COATED ORAL at 08:50

## 2018-08-08 RX ADMIN — OXYCODONE HYDROCHLORIDE 10 MG: 10 TABLET, FILM COATED, EXTENDED RELEASE ORAL at 08:51

## 2018-08-08 RX ADMIN — CLONIDINE HYDROCHLORIDE 0.3 MG: 0.2 TABLET ORAL at 08:50

## 2018-08-08 RX ADMIN — Medication 10 ML: at 19:59

## 2018-08-08 RX ADMIN — DOCUSATE SODIUM 100 MG: 100 CAPSULE, LIQUID FILLED ORAL at 08:50

## 2018-08-08 RX ADMIN — OXYCODONE HYDROCHLORIDE AND ACETAMINOPHEN 1 TABLET: 5; 325 TABLET ORAL at 18:25

## 2018-08-08 RX ADMIN — INSULIN LISPRO 1 UNITS: 100 INJECTION, SOLUTION INTRAVENOUS; SUBCUTANEOUS at 21:48

## 2018-08-08 RX ADMIN — ALLOPURINOL 100 MG: 100 TABLET ORAL at 08:51

## 2018-08-08 RX ADMIN — CARVEDILOL 12.5 MG: 6.25 TABLET, FILM COATED ORAL at 08:51

## 2018-08-08 RX ADMIN — Medication 4 UNITS: at 11:37

## 2018-08-08 RX ADMIN — Medication 10 ML: at 08:51

## 2018-08-08 RX ADMIN — INSULIN LISPRO 30 UNITS: 100 INJECTION, SOLUTION INTRAVENOUS; SUBCUTANEOUS at 08:51

## 2018-08-08 RX ADMIN — POLYETHYLENE GLYCOL 3350 17 G: 17 POWDER, FOR SOLUTION ORAL at 08:51

## 2018-08-08 RX ADMIN — CARVEDILOL 12.5 MG: 6.25 TABLET, FILM COATED ORAL at 17:33

## 2018-08-08 ASSESSMENT — PAIN SCALES - GENERAL
PAINLEVEL_OUTOF10: 6
PAINLEVEL_OUTOF10: 4
PAINLEVEL_OUTOF10: 8
PAINLEVEL_OUTOF10: 5
PAINLEVEL_OUTOF10: 4
PAINLEVEL_OUTOF10: 7
PAINLEVEL_OUTOF10: 7

## 2018-08-08 ASSESSMENT — PAIN DESCRIPTION - DESCRIPTORS
DESCRIPTORS: ACHING
DESCRIPTORS: ACHING
DESCRIPTORS: ACHING;DISCOMFORT

## 2018-08-08 ASSESSMENT — PAIN DESCRIPTION - PAIN TYPE
TYPE: SURGICAL PAIN

## 2018-08-08 ASSESSMENT — PAIN DESCRIPTION - LOCATION
LOCATION: BACK

## 2018-08-08 ASSESSMENT — PAIN DESCRIPTION - ORIENTATION
ORIENTATION: LOWER

## 2018-08-08 ASSESSMENT — PAIN DESCRIPTION - FREQUENCY: FREQUENCY: INTERMITTENT

## 2018-08-08 NOTE — PROGRESS NOTES
Bending, No Lifting, No Twisting  Other position/activity restrictions: drains       Prior Level of Function:  ADL Assistance: Independent  Homemaking Assistance: Needs assistance (report niece will clean for Pt)  Ambulation Assistance: Independent  Transfer Assistance: Independent  Additional Comments: per pt niece to stay with him at discharge as long as he needs to assist. Pt reports using RW prior to sx. Subjective:     Subjective: RN approved therapy session. Pt. seated in BS chair upon arrival. Pt. pleasant and agreeable to therapy session. Pt. impulsive at times throughout session and requires cues for safety. Pt. very talkative throughout session. Pain:  Yes. Pain Assessment  Pain Assessment: 0-10  Pain Level: 4  Pain Type: Surgical pain  Pain Location: Back  Pain Orientation: Lower       Social/Functional:  Lives With: Alone  Type of Home: House  Home Layout: Two level, Able to Live on Main level with bedroom/bathroom  Home Access: Stairs to enter with rails  Entrance Stairs - Number of Steps: 3  Home Equipment: Rolling walker, Sock aid, Reacher     Objective:       Transfers  Sit to Stand: Contact guard assistance  Stand to sit: Contact guard assistance (Cues to keep walker close. Cues for safety as pt. reaches for chair prior to positioning himself in front of chair)       Ambulation 1  Surface: level tile  Device: Rolling Walker  Assistance: Contact guard assistance  Quality of Gait: Slow kylee and velocity. Decreased B step length and heel strike. Forward flexed posture with multiple cues to correct. Pt. lifting up walker and letting go of walker at times and required cues for safety. Cues to stay close to walker. Pt. slightly unsteady throughout.    Distance: 100' x 1 with few standing rest breaks            Exercises:  Exercises  Comments: Pt. performed seated B LE ther ex 10x each consisting of ankle pumps, glute/quad sets, heel slides, hip abd/add, long arc quads, and marches all to increase strength and improve functional mobiltiy. Pt. requires cues for correct technique and to stay on task throughout. Activity Tolerance:  Activity Tolerance: Patient limited by fatigue  Activity Tolerance: Pt. requires many cues for safety and back precautions. Assessment: Body structures, Functions, Activity limitations: Decreased functional mobility , Decreased endurance, Decreased balance, Decreased strength  Assessment: Pt. tolerated session fairly well. Pt. impulsive and requires cues throughout for safety. Pt. not compliant with back precautions at times. Educated pt. on proper technique with transfers and ambulation. Pt. slightly unsteady but with no LOB. Pt. would benefit from continued skilled PT to increase strength and edurance to enhance functional mobility. Prognosis: Good  REQUIRES PT FOLLOW UP: Yes  Discharge Recommendations: Continue to assess pending progress    Patient Education:  Patient Education: POC, ther ex, ambulation, transfers, safety    Equipment Recommendations:  Equipment Needed: No    Safety:  Type of devices:  All fall risk precautions in place, Patient at risk for falls, Gait belt, Chair alarm in place, Left in chair, Nurse notified    Plan:  Times per week: 6X O  Times per day: Daily  Specific instructions for Next Treatment: therex and mobility with back precautions  Current Treatment Recommendations: Strengthening, Transfer Training, Balance Training, Functional Mobility Training, Stair training, Gait Training, Safety Education & Training, Home Exercise Program, Pain Management, Equipment Evaluation, Education, & procurement, Patient/Caregiver Education & Training, Endurance Training    Goals:  Patient goals : home with niece help    Short term goals  Time Frame for Short term goals: 2 weeks  Short term goal 1: bed mobility with MOD I to get in/out of bed  Short term goal 2: transfer with S to get in/out of chairs  Short term goal 3: amb 100'x1 with RW and S to

## 2018-08-08 NOTE — PLAN OF CARE
Problem: DISCHARGE BARRIERS  Goal: Patient's continuum of care needs are met  Outcome: Ongoing  Patient will return home at [place] with [facility], nursing,OT, PT.

## 2018-08-08 NOTE — CARE COORDINATION
8/8/18, 2:41 PM      Erick Villalpando day: 2  Location: [facility]07/007- Reason for admit: Spinal stenosis of lumbar region with neurogenic claudication [M48.062]   Procedure:   8/6 REVISION LAMINECTOMY, LUMBAR LAMINECTOMY WITH PSF L3-S1,  WITH SOLERA 5.5 AND TRENT DBM LOCAL BONE GRAFTING  Treatment Plan of Care: POD #2. Walked 100' with RW, CGA with PT today. Hemovac x2. On room air. I&O, IS, n/v checks, drain care, wound care, SCDs, ambulate. Coreg, catapres, prn flexeril, hydralazine, SSI ACHS & lantus, ditropan xl, oxycontin, prn percocet, miralax, zocor. PCP: Magalys Euceda MD  Readmission Risk Score: 16%  Discharge Plan: Pt refusing TCU. Plan home with miguel and new [facility]  for RN/PT/OT. Has walker. SW on case.

## 2018-08-08 NOTE — PROGRESS NOTES
[address]  INPATIENT OCCUPATIONAL THERAPY  [facility] ORTHOPEDICS 7K  DAILY NOTE    Time:  Time In: 4877  Time Out: 1525  Timed Code Treatment Minutes: 40 Minutes  Minutes: 40          Date: 2018  Patient Name: Lisa Elias,   Gender: male      Room: [facility]007-A  MRN: [id number]  : 1940  (66 y.o.)  Referring Practitioner: Dr. Myrna Day  Diagnosis: spinal stenosis of lumbar region with neurogenic claudication  Additional Pertinent Hx: s/p REVISION LAMINECTOMY, LUMBAR LAMINECTOMY WITH PSF L3-S1,  WITH SOLERA 5.5 AND TRENT DBM LOCAL BONE GRAFTING on 18    Past Medical History:   Diagnosis Date    Anemia in chronic kidney disease(285.21)     Arthritis     Chronic kidney disease, stage III (moderate)     Stage IIIB    Hematuria 2014    Hyperlipidemia     Hypertension     Proteinuria     Sleep apnea     doesn't wear CPAP    Type II or unspecified type diabetes mellitus without mention of complication, not stated as uncontrolled     Vitamin D deficiency     Wears dentures     Wears glasses     Wears hearing aid     bilateral     Past Surgical History:   Procedure Laterality Date    APPENDECTOMY      BLEPHAROPLASTY Bilateral     [facility]ARDI[facility]ATION  2014    [facility]    CARPAL TUNNEL RELEASE Left 2013    COLONOSCOPY  , ,    DENTAL SURGERY      full mouth extraction    OTHER SURGICAL HISTORY      piece of steel removed left hand    OTHER SURGICAL HISTORY      goiter removed    OTHER SURGICAL HISTORY  14    arteriogram     TX OFFICE/OUTPT VISIT,PROCEDURE ONLY N/A 2018    REVISION LAMINECTOMY, LUMBAR LAMINECTOMY WITH PSF L3-S1, PLIF L5-S1 WITH SOLERA 5.5/CAPSTONE AND TRENT DBM LOCAL BONE GRAFTING VS ICBG performed by Donna Contreras MD at [address] [facility] Left     UPPER GASTROINTESTINAL ENDOSCOPY         Restrictions/Precautions:  General Precautions, Fall Risk                    Spinal Precautions: No Bending, No

## 2018-08-08 NOTE — PROGRESS NOTES
insulin glargine  44 Units Subcutaneous Nightly    simvastatin  40 mg Oral Nightly    cloNIDine  0.3 mg Oral TID    carvedilol  12.5 mg Oral BID WC    allopurinol  100 mg Oral Daily    oxybutynin  1 tablet Oral Daily    hydrALAZINE  25 mg Oral BID    sodium chloride flush  10 mL Intravenous 2 times per day    docusate sodium  100 mg Oral BID    oxyCODONE  10 mg Oral 2 times per day    insulin lispro  0-12 Units Subcutaneous TID WC    insulin lispro  0-6 Units Subcutaneous Nightly     PRN Meds: sodium chloride flush, acetaminophen, acetaminophen, cyclobenzaprine, magnesium hydroxide, oxyCODONE-acetaminophen **OR** oxyCODONE-acetaminophen, HYDROmorphone **OR** HYDROmorphone, glucose, dextrose, glucagon (rDNA), dextrose, ondansetron      Intake/Output Summary (Last 24 hours) at 08/08/18 1034  Last data filed at 08/08/18 0600   Gross per 24 hour   Intake              810 ml   Output            892.5 ml   Net            -82.5 ml       Diet: DIET CARB CONTROL; Exam:  BP (!) 140/58   Pulse 78   Temp 99.1 °F (37.3 °C) (Oral)   Resp 18   Wt 277 lb (125.6 kg)   SpO2 94%   BMI 38.63 kg/m²     General appearance: No apparent distress, cooperative, morbidly obese   HEENT: NC/AT. Conjunctivae/corneas clear. Neck: Supple. No adenopathy and thyromegaly. Respiratory:  Clear to auscultation, without Rales/Wheezes/Rhonchi. Normal respiratory effort. Cardiovascular: RRR, normal S1/S2 without murmurs, rubs or gallops. No JVD. BLE pitting edema 2+  Abdomen: non-distended, BS+, soft, non-tender  Musculoskeletal:  No clubbing, cyanosis or edema bilaterally. Lower back in surgical dressing   Skin: Skin color, texture, turgor normal.  No rashes or lesions. Neurologic:  Neurovascularly intact without any focal sensory/motor deficits.   Psychiatric: Alert and oriented, thought content appropriate, normal insight    Labs:   Recent Labs      08/07/18   0351  08/08/18   0558   WBC  12.2*  12.6*   HGB  9.1*  8.7*   HCT 28.6*  27.8*   PLT  215  203     Recent Labs      08/07/18   1932   NA  137   K  4.5   CL  106   CO2  20*   BUN  41*   CREATININE  2.6*   CALCIUM  7.7*     No results for input(s): AST, ALT, BILIDIR, BILITOT, ALKPHOS in the last 72 hours. No results for input(s): INR in the last 72 hours. No results for input(s): [place] in the last 72 hours. Urinalysis:    No results found for: Armen Gennaro, BACTERIA, RBCUA, BLOODU, SPECGRAV, [place] [address]    Radiology:  XR LUMBAR SPINE (2-3 VIEWS)   Final Result   Postop appearance lumbar spine. **This report has been created using voice recognition software. It may contain minor errors which are inherent in voice recognition technology. **      Final report electronically signed by Dr. Rita Luna on 8/6/2018 1:51 PM      XR Lumbar Spine 1 VW   Final Result   Intraoperative  appearance of the lumbar spine. **This report has been created using voice recognition software. It may contain minor errors which are inherent in voice recognition technology. **      Final report electronically signed by Dr. Rita Luna on 8/6/2018 2:05 PM      XR Lumbar Spine 1 VW   Final Result   Intraoperative  appearance of the lumbar spine. **This report has been created using voice recognition software. It may contain minor errors which are inherent in voice recognition technology. **      Final report electronically signed by Dr. Rita Luna on 8/6/2018 1:49 PM          Electronically signed by Deloris Cordova MD on 8/8/2018 at 10:34 AM

## 2018-08-09 LAB
BASOPHILS # BLD: 0.4 %
BASOPHILS ABSOLUTE: 0 THOU/MM3 (ref 0–0.1)
EOSINOPHIL # BLD: 1.2 %
EOSINOPHILS ABSOLUTE: 0.1 THOU/MM3 (ref 0–0.4)
ERYTHROCYTE [DISTWIDTH] IN BLOOD BY AUTOMATED COUNT: 14.6 % (ref 11.5–14.5)
ERYTHROCYTE [DISTWIDTH] IN BLOOD BY AUTOMATED COUNT: 45.4 FL (ref 35–45)
GLUCOSE BLD-MCNC: 102 MG/DL (ref 70–108)
GLUCOSE BLD-MCNC: 108 MG/DL (ref 70–108)
GLUCOSE BLD-MCNC: 146 MG/DL (ref 70–108)
GLUCOSE BLD-MCNC: 323 MG/DL (ref 70–108)
HCT VFR BLD CALC: 25.6 % (ref 42–52)
HEMOGLOBIN: 8 GM/DL (ref 14–18)
IMMATURE GRANS (ABS): 0.04 THOU/MM3 (ref 0–0.07)
IMMATURE GRANULOCYTES: 0.4 %
LYMPHOCYTES # BLD: 12 %
LYMPHOCYTES ABSOLUTE: 1.3 THOU/MM3 (ref 1–4.8)
MCH RBC QN AUTO: 26.8 PG (ref 26–33)
MCHC RBC AUTO-ENTMCNC: 31.3 GM/DL (ref 32.2–35.5)
MCV RBC AUTO: 85.6 FL (ref 80–94)
MONOCYTES # BLD: 10.9 %
MONOCYTES ABSOLUTE: 1.2 THOU/MM3 (ref 0.4–1.3)
NUCLEATED RED BLOOD CELLS: 0 /100 WBC
PLATELET # BLD: 209 THOU/MM3 (ref 130–400)
PMV BLD AUTO: 9.7 FL (ref 9.4–12.4)
RBC # BLD: 2.99 MILL/MM3 (ref 4.7–6.1)
SEG NEUTROPHILS: 75.1 %
SEGMENTED NEUTROPHILS ABSOLUTE COUNT: 8.1 THOU/MM3 (ref 1.8–7.7)
WBC # BLD: 10.8 THOU/MM3 (ref 4.8–10.8)

## 2018-08-09 PROCEDURE — 99232 SBSQ HOSP IP/OBS MODERATE 35: CPT | Performed by: INTERNAL MEDICINE

## 2018-08-09 PROCEDURE — 1200000000 HC SEMI PRIVATE

## 2018-08-09 PROCEDURE — 6370000000 HC RX 637 (ALT 250 FOR IP): Performed by: PHYSICIAN ASSISTANT

## 2018-08-09 PROCEDURE — 82948 REAGENT STRIP/BLOOD GLUCOSE: CPT

## 2018-08-09 PROCEDURE — 85025 COMPLETE CBC W/AUTO DIFF WBC: CPT

## 2018-08-09 PROCEDURE — 6370000000 HC RX 637 (ALT 250 FOR IP): Performed by: ORTHOPAEDIC SURGERY

## 2018-08-09 PROCEDURE — 97110 THERAPEUTIC EXERCISES: CPT

## 2018-08-09 PROCEDURE — 36415 COLL VENOUS BLD VENIPUNCTURE: CPT

## 2018-08-09 PROCEDURE — 2580000003 HC RX 258: Performed by: ORTHOPAEDIC SURGERY

## 2018-08-09 PROCEDURE — 97530 THERAPEUTIC ACTIVITIES: CPT

## 2018-08-09 PROCEDURE — 6370000000 HC RX 637 (ALT 250 FOR IP): Performed by: INTERNAL MEDICINE

## 2018-08-09 PROCEDURE — 97116 GAIT TRAINING THERAPY: CPT

## 2018-08-09 RX ORDER — BISACODYL 10 MG
10 SUPPOSITORY, RECTAL RECTAL DAILY PRN
Status: DISCONTINUED | OUTPATIENT
Start: 2018-08-09 | End: 2018-08-12 | Stop reason: HOSPADM

## 2018-08-09 RX ORDER — SODIUM PHOSPHATE,MONO-DIBASIC 19G-7G/118
1 ENEMA (ML) RECTAL ONCE
Status: DISCONTINUED | OUTPATIENT
Start: 2018-08-09 | End: 2018-08-12 | Stop reason: HOSPADM

## 2018-08-09 RX ORDER — SENNA AND DOCUSATE SODIUM 50; 8.6 MG/1; MG/1
2 TABLET, FILM COATED ORAL DAILY PRN
Status: DISCONTINUED | OUTPATIENT
Start: 2018-08-09 | End: 2018-08-12 | Stop reason: HOSPADM

## 2018-08-09 RX ADMIN — HYDRALAZINE HYDROCHLORIDE 25 MG: 25 TABLET, FILM COATED ORAL at 09:57

## 2018-08-09 RX ADMIN — OXYCODONE HYDROCHLORIDE AND ACETAMINOPHEN 1 TABLET: 5; 325 TABLET ORAL at 09:56

## 2018-08-09 RX ADMIN — CARVEDILOL 12.5 MG: 6.25 TABLET, FILM COATED ORAL at 17:00

## 2018-08-09 RX ADMIN — SIMVASTATIN 40 MG: 40 TABLET, FILM COATED ORAL at 20:23

## 2018-08-09 RX ADMIN — POLYETHYLENE GLYCOL 3350 17 G: 17 POWDER, FOR SOLUTION ORAL at 09:57

## 2018-08-09 RX ADMIN — HYDRALAZINE HYDROCHLORIDE 25 MG: 25 TABLET, FILM COATED ORAL at 23:22

## 2018-08-09 RX ADMIN — OXYBUTYNIN CHLORIDE 10 MG: 10 TABLET, EXTENDED RELEASE ORAL at 09:57

## 2018-08-09 RX ADMIN — HYDRALAZINE HYDROCHLORIDE 25 MG: 25 TABLET, FILM COATED ORAL at 00:54

## 2018-08-09 RX ADMIN — ALLOPURINOL 100 MG: 100 TABLET ORAL at 09:57

## 2018-08-09 RX ADMIN — CLONIDINE HYDROCHLORIDE 0.3 MG: 0.2 TABLET ORAL at 20:23

## 2018-08-09 RX ADMIN — INSULIN LISPRO 30 UNITS: 100 INJECTION, SOLUTION INTRAVENOUS; SUBCUTANEOUS at 09:57

## 2018-08-09 RX ADMIN — DOCUSATE SODIUM 100 MG: 100 CAPSULE, LIQUID FILLED ORAL at 09:56

## 2018-08-09 RX ADMIN — CARVEDILOL 12.5 MG: 6.25 TABLET, FILM COATED ORAL at 09:57

## 2018-08-09 RX ADMIN — CLONIDINE HYDROCHLORIDE 0.3 MG: 0.2 TABLET ORAL at 00:53

## 2018-08-09 RX ADMIN — OXYCODONE HYDROCHLORIDE AND ACETAMINOPHEN 1 TABLET: 5; 325 TABLET ORAL at 05:59

## 2018-08-09 RX ADMIN — Medication 8 UNITS: at 11:35

## 2018-08-09 RX ADMIN — ACETAMINOPHEN 650 MG: 325 TABLET ORAL at 20:23

## 2018-08-09 RX ADMIN — INSULIN LISPRO 30 UNITS: 100 INJECTION, SOLUTION INTRAVENOUS; SUBCUTANEOUS at 11:35

## 2018-08-09 RX ADMIN — INSULIN GLARGINE 44 UNITS: 100 INJECTION, SOLUTION SUBCUTANEOUS at 22:06

## 2018-08-09 RX ADMIN — INSULIN LISPRO 30 UNITS: 100 INJECTION, SOLUTION INTRAVENOUS; SUBCUTANEOUS at 17:00

## 2018-08-09 RX ADMIN — Medication 10 ML: at 10:03

## 2018-08-09 RX ADMIN — CLONIDINE HYDROCHLORIDE 0.3 MG: 0.2 TABLET ORAL at 09:56

## 2018-08-09 ASSESSMENT — PAIN DESCRIPTION - ORIENTATION
ORIENTATION: LOWER

## 2018-08-09 ASSESSMENT — PAIN SCALES - GENERAL
PAINLEVEL_OUTOF10: 5
PAINLEVEL_OUTOF10: 4
PAINLEVEL_OUTOF10: 5
PAINLEVEL_OUTOF10: 6
PAINLEVEL_OUTOF10: 5
PAINLEVEL_OUTOF10: 6
PAINLEVEL_OUTOF10: 5

## 2018-08-09 ASSESSMENT — PAIN DESCRIPTION - LOCATION
LOCATION: BACK

## 2018-08-09 ASSESSMENT — PAIN DESCRIPTION - FREQUENCY
FREQUENCY: INTERMITTENT

## 2018-08-09 ASSESSMENT — PAIN DESCRIPTION - ONSET
ONSET: ON-GOING
ONSET: ON-GOING

## 2018-08-09 ASSESSMENT — PAIN DESCRIPTION - PROGRESSION: CLINICAL_PROGRESSION: NOT CHANGED

## 2018-08-09 ASSESSMENT — PAIN DESCRIPTION - DESCRIPTORS
DESCRIPTORS: ACHING
DESCRIPTORS: ACHING
DESCRIPTORS: ACHING;DISCOMFORT
DESCRIPTORS: ACHING
DESCRIPTORS: ACHING;DISCOMFORT
DESCRIPTORS: ACHING

## 2018-08-09 ASSESSMENT — PAIN DESCRIPTION - PAIN TYPE
TYPE: SURGICAL PAIN

## 2018-08-09 NOTE — CARE COORDINATION
8/9/18, 1:23 PM      Dakotah Lentz day: 3  Location: [facility]07/007 Reason for admit: Spinal stenosis of lumbar region with neurogenic claudication [M48.062]   Procedure: 8/6 REVISION LAMINECTOMY, LUMBAR LAMINECTOMY WITH PSF L3-S1,  WITH SOLERA 5.5 AND TRENT DBM LOCAL BONE GRAFTING    Treatment Plan of Care: POD #2, dressing care, pain control, following PT and OT notes, drain outputs 2.5 and 50 ml,  No BM yet added Miralax. PCP: Rod Lucero MD  Readmission Risk Score: 16%  Discharge Plan: continues to refuse TCU/[facility], planning home alone with new [facility] with nursing, PT and OT; niece coming to stay with patient at discharge.

## 2018-08-09 NOTE — PROGRESS NOTES
Department of Orthopedic Surgery  Spine Service  Jhon Luna PA-C Progress Note        Subjective:  Pt seated in chair feeling well. Is refusing TCU and wants to go home with Dion[facility]ila PT/OT. Feels that his walking is improving and his niece will be at home to help him. His legs continue to feel well. No acute changes overnight. No BM yet. Per nurse his family would like pt to stay in TCU but he is refusing. Is having some difficulty with confusion and has attempted to ambulate without a walker. It is our preference to have him stay here as well. It is unsafe for him to go home alone at this time, or even with only the help of one person. Vitals  VITALS:  BP (!) 149/81   Pulse 66   Temp 99 °F (37.2 °C) (Oral)   Resp 18   Wt 277 lb (125.6 kg)   SpO2 98%   BMI 38.63 kg/m²   24HR INTAKE/OUTPUT:    Intake/Output Summary (Last 24 hours) at 08/09/18 0640  Last data filed at 08/09/18 0500   Gross per 24 hour   Intake              730 ml   Output            582.5 ml   Net            147.5 ml     URINARY CATHETER OUTPUT (Brunson):  [REMOVED] Urethral Catheter Non-latex 16 fr-Output (mL): 475 mL  DRAIN/TUBE OUTPUT:  Closed/Suction Drain Left Back Accordion-Output (ml): 2.5 ml  Closed/Suction Drain Right Back-Output (ml): 50 ml      PHYSICAL EXAM:    Orientation:  alert and oriented to person, place and time    Incision:  dressing in place, clean, dry, intact  Lower Extremity Motor :  quadriceps, extensor hallucis longus, dorsiflexion, plantarflexion 5/5 bilaterally  Lower Extremity Sensory:  Intact L1-S1    Flatus:  positive    ABNORMAL EXAM FINDINGS:  none    LABS:    HgB:    Lab Results   Component Value Date    HGB 8.7 08/08/2018         ASSESSMENT AND PLAN:    Post operative day 3     1:  Monitor labs and drain output  2:  Activity Level:  OOB with therapy and assistance  3:  Pain Control:  Controlled with medication  4:  Discharge Planning:   Will work on convincing him to stay in Tcu but he is adamant about going

## 2018-08-09 NOTE — PROGRESS NOTES
Virgilio Vega 60  INPATIENT OCCUPATIONAL THERAPY  [facility] ORTHOPEDICS 7K  DAILY NOTE    Time:  Time In: 805  Time Out: 7337  Timed Code Treatment Minutes: 23 Minutes  Minutes: 23        Date: 2018  Patient Name: Bk Lara,   Gender: male      Room: [facility]007-A  MRN: [id number]  : 1940  (66 y.o.)  Referring Practitioner: Dr. Amauri Cerda  Diagnosis: spinal stenosis of lumbar region with neurogenic claudication  Additional Pertinent Hx: s/p REVISION LAMINECTOMY, LUMBAR LAMINECTOMY WITH PSF L3-S1,  WITH SOLERA 5.5 AND TRENT DBM LOCAL BONE GRAFTING on 18    Past Medical History:   Diagnosis Date    Anemia in chronic kidney disease(285.21)     Arthritis     Chronic kidney disease, stage III (moderate)     Stage IIIB    Hematuria 2014    Hyperlipidemia     Hypertension     Proteinuria     Sleep apnea     doesn't wear CPAP    Type II or unspecified type diabetes mellitus without mention of complication, not stated as uncontrolled     Vitamin D deficiency     Wears dentures     Wears glasses     Wears hearing aid     bilateral     Past Surgical History:   Procedure Laterality Date    APPENDECTOMY      BLEPHAROPLASTY Bilateral     CARDIAC CATHETERIZATION  2014    [facility]    CARPAL TUNNEL RELEASE Left 2013    COLONOSCOPY  , ,    DENTAL SURGERY      full mouth extraction    OTHER SURGICAL HISTORY      piece of steel removed left hand    OTHER SURGICAL HISTORY      goiter removed    OTHER SURGICAL HISTORY  14    arteriogram     [place] OFFICE/OUTPT VISIT,PROCEDURE ONLY N/A 2018    REVISION LAMINECTOMY, LUMBAR LAMINECTOMY WITH PSF L3-S1, PLIF L5-S1 WITH SOLERA 5.5/CAPSTONE AND TRENT DBM LOCAL BONE GRAFTING VS ICBG performed by Ana Lyon MD at [facility] Left     UPPER GASTROINTESTINAL ENDOSCOPY         Restrictions/Precautions:  General Precautions, Fall Risk      Spinal Precautions: No Bending, No Lifting, No Twisting  Other position/activity restrictions: drains       Prior Level of Function:  ADL Assistance: Independent  Homemaking Assistance: Needs assistance (report niece will clean for Pt)  Ambulation Assistance: Independent  Transfer Assistance: Independent  Additional Comments: per pt niece to stay with him at discharge as long as he needs to assist. Pt reports using RW prior to sx. Subjective   Subjective: Pt pleasant and cooperative. Pt seated in chair and agreeable to OT treatment  Comments: Nurse ok'd activity    Pain:  Pain Assessment  Patient Currently in Pain: Yes  Pain Assessment: 0-10  Pain Level: 5  Pain Location: Back  Pain Orientation: Lower     Objective  Overall Cognitive Status: Exceptions (Pt impulsive, decreased safety and insight noted)  Memory: Decreased recall of recent events  Safety Judgement: Decreased awareness of need for safety;Decreased awareness of need for assistance  Problem Solving: Assistance required to generate solutions;Assistance required to identify errors made;Assistance required to correct errors made;Assistance required to implement solutions  Insights: Decreased awareness of deficits  Cognition Comment: Pt resistant to new education and dismisses all attempts at education on safety and benefits of continued therapy prior to returning home. ADL  LE Dressing: Contact guard assistance (donning and doffing pants. Pt requires cues for observing back precautions and would not allow education on LHAE. Pt reports having reacher at home and when ELIZONDO attempted to offer education on use for LB dressing pt states \"I don't need it for that\". )     Transfers  Sit to stand: Minimal assistance (from chair)  Stand to sit: Contact guard assistance (to chair)     Balance  Sitting Balance: Supervision  Standing Balance: Contact guard assistance     Time: x 4 mins  Activity: standing task completed to increase standing tolerance for cooking tasks in home environment.  Task completed with 1-2 hand release with cues needed for safety. Pt pushed walker away at one point stating \"I don't need that\". ELIZONDO strongly encouraged use of walker in home environment     Activity Tolerance:  Activity Tolerance: Patient Tolerated treatment well    Assessment:     Performance deficits / Impairments: Decreased functional mobility , Decreased ADL status, Decreased safe awareness, Decreased balance, Decreased endurance  Prognosis: Fair  Discharge Recommendations: Continue to assess pending progress, Home with Home health OT, Patient would benefit from continued therapy after discharge    Patient Education:  Patient Education: back precautions, ADLS, role of OT, safety in home environment  Barriers to Learning: decreased cognition    Equipment Recommendations:  Equipment Needed: No  Other: Continue to assess pending progress    Safety:  Safety Devices in place: Yes  Type of devices: Gait belt, Call light within reach, Chair alarm in place, Nurse notified, Left in chair, Patient at risk for falls, All fall risk precautions in place    Plan:  Times per week: 6x  Current Treatment Recommendations: Balance Training, Functional Mobility Training, Safety Education & Training, Self-Care / ADL  Plan Comment: Pt would benefit from continued OT when medically stable and discharged from Acute returning to home.   Specific instructions for Next Treatment: Functional mobility; safety awareness while doing ADLs and using the walker; back precautions    Goals:  Patient goals : go home    Short term goals  Time Frame for Short term goals: 2 weeks  Short term goal 1: Tolerate standing 2-3 min with S for increased ease of sinkside grooming  Short term goal 2: Complete various t/fs including toilet with S & min vcs for safe technique  Short term goal 3: Complete mobility to/from bathroom with RW, CGA, & 0-2 vcs for safety  Short term goal 4: Complete LE dressing with min A & LH AE prn  Short term goal 5: Demo good awareness of back

## 2018-08-09 NOTE — PROGRESS NOTES
Hospitalist Progress Note    Patient:  Lai Pelaez      Unit/Bed:7K-07/007-A    YOB: 1940    MRN: [id number]       Acct: [de-identified]     PCP: Homero Hicks MD    Date of Admission: 8/6/2018    Chief comlaint:  Post-op med management     Subjective: sitting up in chair comfortably, back incisional pain under control, denies fever, sob, chest pain, abdominal pain, nausea, numbness. No BM, passing gas. Noticed a small vesicle over inner left thigh. Non-painful. Medications:  Reviewed    Infusion Medications    dextrose       Scheduled Medications    sodium phosphate  1 enema Rectal Once    polyethylene glycol  17 g Oral Daily    hydrALAZINE  25 mg Oral 3 times per day    insulin lispro  30 Units Subcutaneous TID WC    insulin glargine  44 Units Subcutaneous Nightly    simvastatin  40 mg Oral Nightly    cloNIDine  0.3 mg Oral TID    carvedilol  12.5 mg Oral BID WC    allopurinol  100 mg Oral Daily    oxybutynin  1 tablet Oral Daily    sodium chloride flush  10 mL Intravenous 2 times per day    docusate sodium  100 mg Oral BID    oxyCODONE  10 mg Oral 2 times per day    insulin lispro  0-12 Units Subcutaneous TID WC    insulin lispro  0-6 Units Subcutaneous Nightly     PRN Meds: bisacodyl, sodium chloride flush, acetaminophen, acetaminophen, cyclobenzaprine, magnesium hydroxide, oxyCODONE-acetaminophen **OR** oxyCODONE-acetaminophen, HYDROmorphone **OR** HYDROmorphone, glucose, dextrose, glucagon (rDNA), dextrose, ondansetron      Intake/Output Summary (Last 24 hours) at 08/09/18 1440  Last data filed at 08/09/18 1355   Gross per 24 hour   Intake              970 ml   Output            122.5 ml   Net            847.5 ml       Diet: DIET CARB CONTROL;   Dietary Nutrition Supplements: Other Oral Supplement (see comment)    Exam:  BP (!) 140/72   Pulse 87   Temp 98.4 °F (36.9 °C) (Oral)   Resp 16   Wt 277 lb (125.6 kg)   SpO2 93%   BMI 38.63 kg/m²     General appearance: No been created using voice recognition software. It may contain minor errors which are inherent in voice recognition technology. **      Final report electronically signed by Dr. Bhavik Fitzpatrick on 8/6/2018 2:05 PM      XR Lumbar Spine 1 VW   Final Result   Intraoperative  appearance of the lumbar spine. **This report has been created using voice recognition software. It may contain minor errors which are inherent in voice recognition technology. **      Final report electronically signed by Dr. Bhavik Fitzpatrick on 8/6/2018 1:49 PM           Assessment/Plan:    1. Spinal stenosis:  S/p surgery. Pain control, DVT prophylaxis. PT/OT. managed per ortho  2. HTN: BP better controlled. Cont home coreg,clonidine,hydralazine (increased to 25mg tid). 3. DM2: A1c in July 10 was 8.2 BG controlled. Cont home insulin regimen (lantus 44 units nightly, humalog 30 units tidac)  plus ISS   4. 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 Will cont to follow, avoid nephrotoxins. 5. HLD: cont statin    6. Anemia: baseline ~10, 9. 1after surgery, likely acute blood loss on top of chronic disease related anemia. Cont f/u CBC. 7. Edema: BLE edema. Unchanged. Not on diuresis at home. Keep LE elevated. Compression stockings. F/u as outpatient for workup and management.    8. Morbid obesity: encouraged life style modifications       Code Status: Full Code     DVT prophylaxis: [] Lovenox                                 [x] SCDs                                 [] SQ Heparin                                 [] Encourage ambulation                                 [] Already on Anticoagulation     Anticipated Discharge on : per ortho       Disposition:      [x] Home with [facility]                             [] TCU                             [] Rehab                             [] Psych                             [] SNF                             [] [facility] [] Other-     PT/OT Eval Status: ordered     Electronically signed by Belen Pimentel MD on 8/9/2018 at 2:40 PM

## 2018-08-09 NOTE — PROGRESS NOTES
limitations: Decreased functional mobility , Decreased endurance, Decreased balance, Decreased strength  Assessment: Pt tolerated session fairly well. Decreased safety awarness, impulsive, requires cuing for safety.  Would benefit from continued therapy before returning home   Prognosis: Good  REQUIRES PT FOLLOW UP: Yes  Discharge Recommendations: Continue to assess pending progress    Patient Education:  Patient Education: POC    Equipment Recommendations:  Equipment Needed: No    Safety:  Type of devices: Gait belt, All fall risk precautions in place, Bed alarm in place, Call light within reach, Left in bed, Patient at risk for falls    Plan:  Times per week: 6X O  Times per day: Daily  Specific instructions for Next Treatment: therex and mobility with back precautions  Current Treatment Recommendations: Strengthening, Transfer Training, Balance Training, Functional Mobility Training, Stair training, Gait Training, Safety Education & Training, Home Exercise Program, Pain Management, Equipment Evaluation, Education, & procurement, Patient/Caregiver Education & Training, Endurance Training    Goals:  Patient goals : home with niece help    Short term goals  Time Frame for Short term goals: 2 weeks  Short term goal 1: bed mobility with MOD I to get in/out of bed  Short term goal 2: transfer with S to get in/out of chairs  Short term goal 3: amb 100'x1 with RW and S to walk safely in home  Short term goal 4: negotiate 3 steps with HR and SBA to enter home safely    Long term goals  Time Frame for Long term goals : no LTGs set secondary to short ELOS

## 2018-08-09 NOTE — PLAN OF CARE
Problem: Pain:  Goal: Pain level will decrease  Pain level will decrease   Outcome: Ongoing  Patient reports improvement in post-op back pain when pain medication taken. He is then able to achieve his pain goal of < 4. Problem: Musculor/Skeletal Functional Status  Goal: Highest potential functional level  Outcome: Ongoing  Therapy continues to work with patient to improve strength and mobility and evaluate potential discharge needs. Problem: Infection:  Goal: Will remain free from infection  Will remain free from infection   Outcome: Ongoing  No signs or symptoms of infection. Remains afebrile. Back dressing is free of drainage. Hemovacs remain patent. Problem: Safety:  Goal: Free from accidental physical injury  Free from accidental physical injury   Outcome: Ongoing  Bed alarm and chair alarm are used. Call light is within reach. Bedside table and personal belongings are within reach. Problem: Daily Care:  Goal: Daily care needs are met  Daily care needs are met   Outcome: Ongoing  Patient will need assistance completing his daily care. Problem: Skin Integrity:  Goal: Skin integrity will stabilize  Skin integrity will stabilize   Outcome: Ongoing  No skin issues noted. He is out of bed to chair with assist.    Problem: Discharge Planning:  Goal: Patients continuum of care needs are met  Patients continuum of care needs are met   Outcome: Ongoing  Patient prefers to go home with home health or TCU. TCU  is evaluating him. Problem: DISCHARGE BARRIERS  Goal: Patient's continuum of care needs are met  Outcome: Ongoing   and  are assisting with discharge planning. Comments: Care plan reviewed with patient. Patient verbalizes understanding of the plan of care and contributes to goal setting.

## 2018-08-09 NOTE — CARE COORDINATION
8/9/18, 9:18 AM    DISCHARGE BARRIERS      Spoke with [facility]. [facility] will accept at discharge.

## 2018-08-10 LAB
ANION GAP SERPL CALCULATED.3IONS-SCNC: 14 MEQ/L (ref 8–16)
BUN BLDV-MCNC: 40 MG/DL (ref 7–22)
CALCIUM SERPL-MCNC: 8 MG/DL (ref 8.5–10.5)
CHLORIDE BLD-SCNC: 105 MEQ/L (ref 98–111)
CO2: 17 MEQ/L (ref 23–33)
CREAT SERPL-MCNC: 2.1 MG/DL (ref 0.4–1.2)
ERYTHROCYTE [DISTWIDTH] IN BLOOD BY AUTOMATED COUNT: 14.6 % (ref 11.5–14.5)
ERYTHROCYTE [DISTWIDTH] IN BLOOD BY AUTOMATED COUNT: 45.3 FL (ref 35–45)
GFR SERPL CREATININE-BSD FRML MDRD: 31 ML/MIN/1.73M2
GLUCOSE BLD-MCNC: 120 MG/DL (ref 70–108)
GLUCOSE BLD-MCNC: 231 MG/DL (ref 70–108)
GLUCOSE BLD-MCNC: 76 MG/DL (ref 70–108)
GLUCOSE BLD-MCNC: 90 MG/DL (ref 70–108)
GLUCOSE BLD-MCNC: 91 MG/DL (ref 70–108)
HCT VFR BLD CALC: 25.4 % (ref 42–52)
HEMOGLOBIN: 7.9 GM/DL (ref 14–18)
MCH RBC QN AUTO: 26.7 PG (ref 26–33)
MCHC RBC AUTO-ENTMCNC: 31.1 GM/DL (ref 32.2–35.5)
MCV RBC AUTO: 85.8 FL (ref 80–94)
PLATELET # BLD: 236 THOU/MM3 (ref 130–400)
PMV BLD AUTO: 9.6 FL (ref 9.4–12.4)
POTASSIUM SERPL-SCNC: 4.2 MEQ/L (ref 3.5–5.2)
PRO-BNP: 1554 PG/ML (ref 0–1800)
RBC # BLD: 2.96 MILL/MM3 (ref 4.7–6.1)
SODIUM BLD-SCNC: 136 MEQ/L (ref 135–145)
WBC # BLD: 9.3 THOU/MM3 (ref 4.8–10.8)

## 2018-08-10 PROCEDURE — 80048 BASIC METABOLIC PNL TOTAL CA: CPT

## 2018-08-10 PROCEDURE — 82948 REAGENT STRIP/BLOOD GLUCOSE: CPT

## 2018-08-10 PROCEDURE — 2580000003 HC RX 258: Performed by: ORTHOPAEDIC SURGERY

## 2018-08-10 PROCEDURE — 97110 THERAPEUTIC EXERCISES: CPT

## 2018-08-10 PROCEDURE — 99232 SBSQ HOSP IP/OBS MODERATE 35: CPT | Performed by: INTERNAL MEDICINE

## 2018-08-10 PROCEDURE — 83880 ASSAY OF NATRIURETIC PEPTIDE: CPT

## 2018-08-10 PROCEDURE — 6370000000 HC RX 637 (ALT 250 FOR IP): Performed by: ORTHOPAEDIC SURGERY

## 2018-08-10 PROCEDURE — 97116 GAIT TRAINING THERAPY: CPT

## 2018-08-10 PROCEDURE — 97530 THERAPEUTIC ACTIVITIES: CPT

## 2018-08-10 PROCEDURE — 1200000000 HC SEMI PRIVATE

## 2018-08-10 PROCEDURE — 36415 COLL VENOUS BLD VENIPUNCTURE: CPT

## 2018-08-10 PROCEDURE — 85027 COMPLETE CBC AUTOMATED: CPT

## 2018-08-10 PROCEDURE — 6370000000 HC RX 637 (ALT 250 FOR IP): Performed by: INTERNAL MEDICINE

## 2018-08-10 PROCEDURE — 6370000000 HC RX 637 (ALT 250 FOR IP): Performed by: PHYSICIAN ASSISTANT

## 2018-08-10 RX ADMIN — INSULIN LISPRO 30 UNITS: 100 INJECTION, SOLUTION INTRAVENOUS; SUBCUTANEOUS at 12:05

## 2018-08-10 RX ADMIN — CARVEDILOL 12.5 MG: 6.25 TABLET, FILM COATED ORAL at 17:49

## 2018-08-10 RX ADMIN — OXYBUTYNIN CHLORIDE 10 MG: 10 TABLET, EXTENDED RELEASE ORAL at 08:17

## 2018-08-10 RX ADMIN — HYDRALAZINE HYDROCHLORIDE 25 MG: 25 TABLET, FILM COATED ORAL at 08:16

## 2018-08-10 RX ADMIN — OXYCODONE HYDROCHLORIDE AND ACETAMINOPHEN 1 TABLET: 5; 325 TABLET ORAL at 08:16

## 2018-08-10 RX ADMIN — Medication 10 ML: at 08:18

## 2018-08-10 RX ADMIN — SIMVASTATIN 40 MG: 40 TABLET, FILM COATED ORAL at 21:54

## 2018-08-10 RX ADMIN — ALLOPURINOL 100 MG: 100 TABLET ORAL at 08:17

## 2018-08-10 RX ADMIN — POLYETHYLENE GLYCOL 3350 17 G: 17 POWDER, FOR SOLUTION ORAL at 08:17

## 2018-08-10 RX ADMIN — HYDRALAZINE HYDROCHLORIDE 25 MG: 25 TABLET, FILM COATED ORAL at 17:49

## 2018-08-10 RX ADMIN — CYCLOBENZAPRINE HYDROCHLORIDE 10 MG: 10 TABLET, FILM COATED ORAL at 08:16

## 2018-08-10 RX ADMIN — INSULIN LISPRO 30 UNITS: 100 INJECTION, SOLUTION INTRAVENOUS; SUBCUTANEOUS at 08:20

## 2018-08-10 RX ADMIN — CARVEDILOL 12.5 MG: 6.25 TABLET, FILM COATED ORAL at 08:17

## 2018-08-10 RX ADMIN — INSULIN GLARGINE 44 UNITS: 100 INJECTION, SOLUTION SUBCUTANEOUS at 21:55

## 2018-08-10 RX ADMIN — ACETAMINOPHEN 650 MG: 325 TABLET ORAL at 04:42

## 2018-08-10 RX ADMIN — CLONIDINE HYDROCHLORIDE 0.3 MG: 0.2 TABLET ORAL at 17:49

## 2018-08-10 RX ADMIN — ACETAMINOPHEN 650 MG: 325 TABLET ORAL at 21:54

## 2018-08-10 RX ADMIN — OXYCODONE HYDROCHLORIDE AND ACETAMINOPHEN 1 TABLET: 5; 325 TABLET ORAL at 17:48

## 2018-08-10 RX ADMIN — CLONIDINE HYDROCHLORIDE 0.3 MG: 0.2 TABLET ORAL at 22:03

## 2018-08-10 RX ADMIN — CLONIDINE HYDROCHLORIDE 0.3 MG: 0.2 TABLET ORAL at 08:17

## 2018-08-10 ASSESSMENT — PAIN SCALES - GENERAL
PAINLEVEL_OUTOF10: 5
PAINLEVEL_OUTOF10: 8
PAINLEVEL_OUTOF10: 7
PAINLEVEL_OUTOF10: 7
PAINLEVEL_OUTOF10: 6
PAINLEVEL_OUTOF10: 6

## 2018-08-10 ASSESSMENT — PAIN DESCRIPTION - PROGRESSION
CLINICAL_PROGRESSION: NOT CHANGED

## 2018-08-10 ASSESSMENT — PAIN DESCRIPTION - DESCRIPTORS
DESCRIPTORS: ACHING
DESCRIPTORS: ACHING

## 2018-08-10 ASSESSMENT — PAIN DESCRIPTION - PAIN TYPE
TYPE: SURGICAL PAIN
TYPE: SURGICAL PAIN

## 2018-08-10 ASSESSMENT — PAIN DESCRIPTION - LOCATION
LOCATION: BACK
LOCATION: BACK

## 2018-08-10 ASSESSMENT — PAIN DESCRIPTION - FREQUENCY
FREQUENCY: INTERMITTENT
FREQUENCY: INTERMITTENT

## 2018-08-10 ASSESSMENT — PAIN DESCRIPTION - ONSET
ONSET: ON-GOING
ONSET: ON-GOING

## 2018-08-10 ASSESSMENT — PAIN DESCRIPTION - ORIENTATION
ORIENTATION: LOWER
ORIENTATION: LOWER

## 2018-08-10 NOTE — PROGRESS NOTES
Hospitalist Progress Note    Patient:  Jonnie Angulo      Unit/Bed:7K-07/007-A    YOB: 1940    MRN: [id number]       Acct: [de-identified]     PCP: Audrey Carmichael MD    Date of Admission: 8/6/2018    Chief comlaint:  Post-op med management      Subjective: lying up in bed comfortably, reporting back incisional pain under control, denies fever, sob, chest pain, abdominal pain, nausea, numbness. No BM yet. Requested to go home. Refused TCU. Medications:  Reviewed    Infusion Medications    dextrose       Scheduled Medications    sodium phosphate  1 enema Rectal Once    polyethylene glycol  17 g Oral Daily    hydrALAZINE  25 mg Oral 3 times per day    insulin lispro  30 Units Subcutaneous TID WC    insulin glargine  44 Units Subcutaneous Nightly    simvastatin  40 mg Oral Nightly    cloNIDine  0.3 mg Oral TID    carvedilol  12.5 mg Oral BID WC    allopurinol  100 mg Oral Daily    oxybutynin  1 tablet Oral Daily    sodium chloride flush  10 mL Intravenous 2 times per day    insulin lispro  0-12 Units Subcutaneous TID WC    insulin lispro  0-6 Units Subcutaneous Nightly     PRN Meds: bisacodyl, sennosides-docusate sodium, sodium chloride flush, acetaminophen, acetaminophen, cyclobenzaprine, magnesium hydroxide, oxyCODONE-acetaminophen **OR** oxyCODONE-acetaminophen, HYDROmorphone **OR** HYDROmorphone, glucose, dextrose, glucagon (rDNA), dextrose, ondansetron      Intake/Output Summary (Last 24 hours) at 08/10/18 1215  Last data filed at 08/10/18 0450   Gross per 24 hour   Intake             1360 ml   Output              130 ml   Net             1230 ml       Diet: DIET CARB CONTROL; Dietary Nutrition Supplements: Other Oral Supplement (see comment)    Exam:  /70   Pulse 70   Temp 99.2 °F (37.3 °C) (Oral)   Resp 14   Wt 277 lb (125.6 kg)   SpO2 95%   BMI 38.63 kg/m²     General appearance: No apparent distress, cooperative, morbidly obese   HEENT: NC/AT.  Conjunctivae/corneas

## 2018-08-10 NOTE — PROGRESS NOTES
Virgilio Vega 60  INPATIENT OCCUPATIONAL THERAPY  [facility] ORTHOPEDICS 7K  DAILY NOTE    Time:  Time In: 1042  Time Out: 1109  Timed Code Treatment Minutes: 27 Minutes  Minutes: 27          Date: 8/10/2018  Patient Name: Coretta Nichols,   Gender: male      [facility]  MRN: [id number]  : 1940  (66 y.o.)  Referring Practitioner: Dr. Kem Elias  Diagnosis: spinal stenosis of lumbar region with neurogenic claudication  Additional Pertinent Hx: s/p REVISION LAMINECTOMY, LUMBAR LAMINECTOMY WITH PSF L3-S1,  WITH SOLERA 5.5 AND TRENT DBM LOCAL BONE GRAFTING on 18    Past Medical History:   Diagnosis Date    Anemia in chronic kidney disease(285.21)     Arthritis     Chronic kidney disease, stage III (moderate)     Stage IIIB    Hematuria 2014    Hyperlipidemia     Hypertension     Proteinuria     Sleep apnea     doesn't wear CPAP    Type II or unspecified type diabetes mellitus without mention of complication, not stated as uncontrolled     Vitamin D deficiency     Wears dentures     Wears glasses     Wears hearing aid     bilateral     Past Surgical History:   Procedure Laterality Date    APPENDECTOMY      BLEPHAROPLASTY Bilateral     CARDIAC CATHETERIZATION  2014    [facility]    CARPAL TUNNEL RELEASE Left 2013    COLONOSCOPY  , ,    DENTAL SURGERY      full mouth extraction    OTHER SURGICAL HISTORY      piece of steel removed left hand    OTHER SURGICAL HISTORY      goiter removed    OTHER SURGICAL HISTORY  14    arteriogram     [place] OFFICE/OUTPT VISIT,PROCEDURE ONLY N/A 2018    REVISION LAMINECTOMY, LUMBAR LAMINECTOMY WITH PSF L3-S1, PLIF L5-S1 WITH SOLERA 5.5/CAPSTONE AND TRENT DBM LOCAL BONE GRAFTING VS ICBG performed by Esperanza Amaro MD at [facility] Left     UPPER GASTROINTESTINAL ENDOSCOPY         Restrictions/Precautions:  General Precautions, Fall Risk                    Spinal Precautions: No Bending, No Lifting, No Twisting  Other position/activity restrictions: drains       Prior Level of Function:  ADL Assistance: Independent  Homemaking Assistance: Needs assistance (report niece will clean for Pt)  Ambulation Assistance: Independent  Transfer Assistance: Independent  Additional Comments: per pt niece to stay with him at discharge as long as he needs to assist. Pt reports using RW prior to sx. Subjective       Subjective: RN okayed session. Pt agreeable with encouragement,   Comments: pt impulsive with movement during session pushing walker away. vc needed for safety with encouragement to use walker with pt verbalizing understanding, would still push away for short distances, decreased carryover of new learning              Pain:       denies  Objective  Cognition Comment: difficulty with new learning, impulsive, decreased safety awareness, unable to recall BLT                              ADL  UE Dressing: Moderate assistance (to don back brace)          Bed mobility  Supine to Sit: Stand by assistance  Sit to Supine: Moderate assistance (to lift legs)  Scooting: Stand by assistance  Comment: vc for log roll technique, extra time needed to position in bed    Transfers  Sit to stand: Contact guard assistance (-close SBA from EOB)  Stand to sit: Contact guard assistance (to EOB)       Balance  Sitting Balance: Supervision  Standing Balance: Contact guard assistance     Time: > 5 minutes leisure task and donning brace     Functional Mobility  Functional - Mobility Device: Rolling Walker  Activity: To/from bathroom  Assist Level: Contact guard assistance  Functional Mobility Comments: pt used no device in room noted to furniture walk, ambulated in hallway with slow pace and RW with cues for posture and safety, fair follow through                         Additional Activities: reviewed back precautions and ADLs.  pt reports having sock aide at home and niece will be there to assist                         Activity Tolerance:  Activity Tolerance: Patient Tolerated treatment well    Assessment:     Performance deficits / Impairments: Decreased functional mobility , Decreased ADL status, Decreased safe awareness, Decreased balance, Decreased endurance  Prognosis: Fair  Discharge Recommendations: Continue to assess pending progress, Home with Home health OT, Patient would benefit from continued therapy after discharge    Patient Education:  Patient Education: back precautions, ADLS, role of OT, safety in home environment  Barriers to Learning: decreased cognition    Equipment Recommendations:  Equipment Needed: No  Other: Continue to assess pending progress    Safety:  Safety Devices in place: Yes  Type of devices: Gait belt, Chair alarm in place, Nurse notified, Patient at risk for falls, All fall risk precautions in place, Bed alarm in place, Left in bed    Plan:  Times per week: 6x  Current Treatment Recommendations: Balance Training, Functional Mobility Training, Safety Education & Training, Self-Care / ADL  Plan Comment: Pt would benefit from continued OT when medically stable and discharged from Acute returning to home.   Specific instructions for Next Treatment: Functional mobility; safety awareness while doing ADLs and using the walker; back precautions    Goals:  Patient goals : go home    Short term goals  Time Frame for Short term goals: 2 weeks  Short term goal 1: Tolerate standing 2-3 min with S for increased ease of sinkside grooming  Short term goal 2: Complete various t/fs including toilet with S & min vcs for safe technique  Short term goal 3: Complete mobility to/from bathroom with RW, CGA, & 0-2 vcs for safety  Short term goal 4: Complete LE dressing with min A & LH AE prn  Short term goal 5: Demo good awareness of back precautions as evidenced by id 2/3 with 0-1 vcs for increased safety with returning to ADLs at home  Long term goals  Time Frame for Long term goals : No LTG set d/t short ELOS

## 2018-08-10 NOTE — PROGRESS NOTES
[address] [address]  INPATIENT PHYSICAL THERAPY  DAILY NOTE  [facility] ORTHOPEDICS 7K - 7K-07/007-A    Time In: 0329  Time Out: 1430  Timed Code Treatment Minutes: 23 Minutes  Minutes: 23          Date: 8/10/2018  Patient Name: Brian Dhillon,  Gender:  male        MRN: [id number]  : 1940  (74 y.o.)     Referring Practitioner: Dr. Desmond Henley  Diagnosis: spinal stenosis of lumbar region with neurogenic claudication  Additional Pertinent Hx: admit with above diagnosis, s/p REVISION LAMINECTOMY, LUMBAR LAMINECTOMY WITH PSF L3-S1,  WITH SOLERA 5.5 AND TRENT DBM LOCAL BONE GRAFTING on 18     Past Medical History:   Diagnosis Date    Anemia in chronic kidney disease(285.21)     Arthritis     Chronic kidney disease, stage III (moderate)     Stage IIIB    Hematuria 2014    Hyperlipidemia     Hypertension     Proteinuria     Sleep apnea     doesn't wear CPAP    Type II or unspecified type diabetes mellitus without mention of complication, not stated as uncontrolled     Vitamin D deficiency     Wears dentures     Wears glasses     Wears hearing aid     bilateral     Past Surgical History:   Procedure Laterality Date    APPENDECTOMY      BLEPHAROPLASTY Bilateral     [facility]    CARPAL TUNNEL RELEASE Left 2013    COLONOSCOPY  , ,    DENTAL SURGERY      full mouth extraction    OTHER SURGICAL HISTORY      piece of steel removed left hand    OTHER SURGICAL HISTORY      goiter removed    OTHER SURGICAL HISTORY  14    arteriogram     TX OFFICE/OUTPT VISIT,PROCEDURE ONLY N/A 2018    REVISION LAMINECTOMY, LUMBAR LAMINECTOMY WITH PSF L3-S1, PLIF L5-S1 WITH SOLERA 5.5/CAPSTONE AND TRENT DBM LOCAL BONE GRAFTING VS ICBG performed by Bernardo Dickerson MD at [facility] Left     UPPER GASTROINTESTINAL ENDOSCOPY         Restrictions/Precautions:  General Precautions, Fall Risk           Spinal Precautions: No goal 2: transfer with S to get in/out of chairs  Short term goal 3: amb 100'x1 with RW and S to walk safely in home  Short term goal 4: negotiate 3 steps with HR and SBA to enter home safely    Long term goals  Time Frame for Long term goals : no LTGs set secondary to short ELOS            AM-PAC Inpatient Mobility without Stair Climbing Raw Score : 14  AM-PAC Inpatient without Stair Climbing T-Scale Score : 40.85  Mobility Inpatient CMS 0-100% Score: 53.33  Mobility Inpatient without Stair CMS G-Code Modifier : CK

## 2018-08-11 LAB
GLUCOSE BLD-MCNC: 171 MG/DL (ref 70–108)
GLUCOSE BLD-MCNC: 263 MG/DL (ref 70–108)
GLUCOSE BLD-MCNC: 373 MG/DL (ref 70–108)
GLUCOSE BLD-MCNC: 94 MG/DL (ref 70–108)

## 2018-08-11 PROCEDURE — 97110 THERAPEUTIC EXERCISES: CPT

## 2018-08-11 PROCEDURE — 6370000000 HC RX 637 (ALT 250 FOR IP): Performed by: PHYSICIAN ASSISTANT

## 2018-08-11 PROCEDURE — 82948 REAGENT STRIP/BLOOD GLUCOSE: CPT

## 2018-08-11 PROCEDURE — 6370000000 HC RX 637 (ALT 250 FOR IP): Performed by: ORTHOPAEDIC SURGERY

## 2018-08-11 PROCEDURE — 97116 GAIT TRAINING THERAPY: CPT

## 2018-08-11 PROCEDURE — 1200000000 HC SEMI PRIVATE

## 2018-08-11 PROCEDURE — 99232 SBSQ HOSP IP/OBS MODERATE 35: CPT | Performed by: INTERNAL MEDICINE

## 2018-08-11 PROCEDURE — 6370000000 HC RX 637 (ALT 250 FOR IP): Performed by: INTERNAL MEDICINE

## 2018-08-11 RX ADMIN — INSULIN GLARGINE 44 UNITS: 100 INJECTION, SOLUTION SUBCUTANEOUS at 20:20

## 2018-08-11 RX ADMIN — MAGNESIUM HYDROXIDE 30 ML: 400 SUSPENSION ORAL at 10:08

## 2018-08-11 RX ADMIN — POLYETHYLENE GLYCOL 3350 17 G: 17 POWDER, FOR SOLUTION ORAL at 10:08

## 2018-08-11 RX ADMIN — OXYBUTYNIN CHLORIDE 10 MG: 10 TABLET, EXTENDED RELEASE ORAL at 10:08

## 2018-08-11 RX ADMIN — HYDRALAZINE HYDROCHLORIDE 25 MG: 25 TABLET, FILM COATED ORAL at 10:08

## 2018-08-11 RX ADMIN — CLONIDINE HYDROCHLORIDE 0.3 MG: 0.2 TABLET ORAL at 10:08

## 2018-08-11 RX ADMIN — INSULIN LISPRO 1 UNITS: 100 INJECTION, SOLUTION INTRAVENOUS; SUBCUTANEOUS at 20:18

## 2018-08-11 RX ADMIN — CARVEDILOL 12.5 MG: 6.25 TABLET, FILM COATED ORAL at 18:05

## 2018-08-11 RX ADMIN — CARVEDILOL 12.5 MG: 6.25 TABLET, FILM COATED ORAL at 10:08

## 2018-08-11 RX ADMIN — CLONIDINE HYDROCHLORIDE 0.3 MG: 0.2 TABLET ORAL at 18:06

## 2018-08-11 RX ADMIN — OXYCODONE HYDROCHLORIDE AND ACETAMINOPHEN 2 TABLET: 5; 325 TABLET ORAL at 18:05

## 2018-08-11 RX ADMIN — INSULIN LISPRO 30 UNITS: 100 INJECTION, SOLUTION INTRAVENOUS; SUBCUTANEOUS at 10:15

## 2018-08-11 RX ADMIN — ALLOPURINOL 100 MG: 100 TABLET ORAL at 10:08

## 2018-08-11 RX ADMIN — MAGESIUM CITRATE 296 ML: 1.75 LIQUID ORAL at 10:08

## 2018-08-11 RX ADMIN — SIMVASTATIN 40 MG: 40 TABLET, FILM COATED ORAL at 20:11

## 2018-08-11 RX ADMIN — CLONIDINE HYDROCHLORIDE 0.3 MG: 0.2 TABLET ORAL at 20:11

## 2018-08-11 RX ADMIN — Medication 6 UNITS: at 10:11

## 2018-08-11 RX ADMIN — HYDRALAZINE HYDROCHLORIDE 25 MG: 25 TABLET, FILM COATED ORAL at 18:05

## 2018-08-11 RX ADMIN — DOCUSATE SODIUM AND SENNOSIDES 2 TABLET: 8.6; 5 TABLET, FILM COATED ORAL at 18:05

## 2018-08-11 ASSESSMENT — PAIN DESCRIPTION - ONSET
ONSET: ON-GOING
ONSET: ON-GOING

## 2018-08-11 ASSESSMENT — PAIN DESCRIPTION - PROGRESSION
CLINICAL_PROGRESSION: NOT CHANGED
CLINICAL_PROGRESSION: NOT CHANGED

## 2018-08-11 ASSESSMENT — PAIN SCALES - GENERAL
PAINLEVEL_OUTOF10: 7
PAINLEVEL_OUTOF10: 7
PAINLEVEL_OUTOF10: 6
PAINLEVEL_OUTOF10: 0
PAINLEVEL_OUTOF10: 4
PAINLEVEL_OUTOF10: 4

## 2018-08-11 ASSESSMENT — PAIN DESCRIPTION - LOCATION
LOCATION: BACK

## 2018-08-11 ASSESSMENT — PAIN DESCRIPTION - FREQUENCY
FREQUENCY: INTERMITTENT
FREQUENCY: INTERMITTENT

## 2018-08-11 ASSESSMENT — PAIN DESCRIPTION - DESCRIPTORS
DESCRIPTORS: ACHING
DESCRIPTORS: ACHING

## 2018-08-11 ASSESSMENT — PAIN DESCRIPTION - ORIENTATION
ORIENTATION: LOWER
ORIENTATION: MID;LOWER

## 2018-08-11 ASSESSMENT — PAIN DESCRIPTION - PAIN TYPE
TYPE: SURGICAL PAIN

## 2018-08-11 NOTE — PROGRESS NOTES
Entered room to find chair alarm alarming. Found pt standing in front of chair sideways. When asked what he was doing? No response from pt. When asked if he was just trying to get things moving? he states \"yeah\". Pt given standby assist to bathroom and waiting in pt room to maintain safety.

## 2018-08-11 NOTE — PROGRESS NOTES
Twisting  Other position/activity restrictions: drains     Prior Level of Function:  ADL Assistance: Independent  Homemaking Assistance: Needs assistance (report niece will clean for Pt)  Ambulation Assistance: Independent  Transfer Assistance: Independent  Additional Comments: per pt niece to stay with him at discharge as long as he needs to assist. Pt reports using RW prior to sx.      Subjective   Subjective: RN OK'ed OT session, pt in BR with tech upon arrival, agreeable with encouragment  Comments: Education re: importance of therapy to increase safety/balance as pt reports he is giong to [employer]" out of his house when they show up, unsure of follow through    Pain: no complaints     Objective  Overall Cognitive Status: Exceptions  Cognition Comment: decreased awareness of need for safety, unable to follow precautions        ADL  Grooming: Contact guard assistance (CGA for safety d/t impulsiveness grooming at sink)      Transfers  Sit to stand: Contact guard assistance  Stand to sit: Contact guard assistance     Balance  Sitting Balance: Supervision  Standing Balance: Contact guard assistance     Time: x3 min  Activity: grooming at sink     Functional Mobility  Functional - Mobility Device: Rolling Walker  Activity: Other (from BR)  Assist Level: Contact guard assistance  Functional Mobility Comments: used no device despite having it in front of pt to furniture walk, decreased balance and safety      Type of ROM/Therapeutic Exercise: AROM  Comment: Pt completed BUE AROM x10 reps x1 set seated with demo's for technique to increase ub strengthening needed for ADLs    Activity Tolerance:  Activity Tolerance: Patient limited by fatigue;Patient Tolerated treatment well;Treatment limited secondary to agitation    Assessment:     Performance deficits / Impairments: Decreased functional mobility , Decreased ADL status, Decreased safe awareness, Decreased balance, Decreased endurance  Prognosis: Elnita Forth  Discharge

## 2018-08-11 NOTE — PROGRESS NOTES
Again entered room to find pt in bathroom going to bathroom and chair alarm alarming. Waited in room while pt voided. Will continue to monitor for safety.

## 2018-08-11 NOTE — PROGRESS NOTES
This nurse in room already charting. pt did not ask this nurse to go to the bathroom. chair started alarming. Waited in room while pt voided. Will continue to monitor for safety.

## 2018-08-11 NOTE — PROGRESS NOTES
Entered room to find pt in bathroom going to bathroom and chair alarm alarming. Waited in room while pt voided.  Will continue to monitor for safety

## 2018-08-11 NOTE — PROGRESS NOTES
Department of Orthopedic Surgery  Spine Service  Ree York PA-C Progress Note        Subjective:  Pt seated in chair feeling well. Pain controlled. Walking well.  No bm yet    Vitals  VITALS:  BP (!) 148/58   Pulse 51   Temp 97.7 °F (36.5 °C) (Oral)   Resp 18   Wt 277 lb (125.6 kg)   SpO2 99%   BMI 38.63 kg/m²   24HR INTAKE/OUTPUT:    Intake/Output Summary (Last 24 hours) at 08/11/18 0931  Last data filed at 08/11/18 5699   Gross per 24 hour   Intake             1020 ml   Output               91 ml   Net              929 ml     URINARY CATHETER OUTPUT (Brunson):  [REMOVED] Urethral Catheter Non-latex 16 fr-Output (mL): 475 mL  DRAIN/TUBE OUTPUT:  Closed/Suction Drain Left Back Accordion-Output (ml): 2 ml  Closed/Suction Drain Right Back-Output (ml): 30 ml      PHYSICAL EXAM:    Orientation:  alert and oriented to person, place and time    Incision:  dressing in place, clean, dry, intact    Lower Extremity Motor :  quadriceps, extensor hallucis longus, dorsiflexion, plantarflexion 5/5 bilaterally  Lower Extremity Sensory:  Intact L1-S1    Flatus:  positive    ABNORMAL EXAM FINDINGS:  none    LABS:    HgB:    Lab Results   Component Value Date    HGB 7.9 08/10/2018         ASSESSMENT AND PLAN:    Post operative day 5     1:  Remove drains, change dressing  2:  Activity Level:  OOB as tolerated  3:  Pain Control:  Controlled with medication  4:  Discharge Planning:  Home once BM  5:  Mag citrate

## 2018-08-11 NOTE — PROGRESS NOTES
[address] [address]  INPATIENT PHYSICAL THERAPY  DAILY NOTE  [facility] ORTHOPEDICS 7K - 7K-07/007-A    Time In: 9331  Time Out: 8721  Timed Code Treatment Minutes: 23 Minutes  Minutes: 23          Date: 2018  Patient Name: Oleg Johnson,  Gender:  male        MRN: [id number]  : 1940  (66 y.o.)     Referring Practitioner: Dr. Lamine Gonzalez  Diagnosis: spinal stenosis of lumbar region with neurogenic claudication  Additional Pertinent Hx: admit with above diagnosis, s/p REVISION LAMINECTOMY, LUMBAR LAMINECTOMY WITH PSF L3-S1,  WITH SOLERA 5.5 AND TRENT DBM LOCAL BONE GRAFTING on 18     Past Medical History:   Diagnosis Date    Anemia in chronic kidney disease(285.21)     Arthritis     Chronic kidney disease, stage III (moderate)     Stage IIIB    Hematuria 2014    Hyperlipidemia     Hypertension     Proteinuria     Sleep apnea     doesn't wear CPAP    Type II or unspecified type diabetes mellitus without mention of complication, not stated as uncontrolled     Vitamin D deficiency     Wears dentures     Wears glasses     Wears hearing aid     bilateral     Past Surgical History:   Procedure Laterality Date    APPENDECTOMY      BLEPHAROPLASTY Bilateral     CARDIAC CATHETERIZATION  2014    [facility]    CARPAL TUNNEL RELEASE Left 2013    COLONOSCOPY  , ,    DENTAL SURGERY      full mouth extraction    OTHER SURGICAL HISTORY      piece of steel removed left hand    OTHER SURGICAL HISTORY      goiter removed    OTHER SURGICAL HISTORY  14    arteriogram     CO OFFICE/OUTPT VISIT,PROCEDURE ONLY N/A 2018    REVISION LAMINECTOMY, LUMBAR LAMINECTOMY WITH PSF L3-S1, PLIF L5-S1 WITH SOLERA 5.5/CAPSTONE AND TRENT DBM LOCAL BONE GRAFTING VS ICBG performed by Yahir Ponce MD at [facility] Left     UPPER GASTROINTESTINAL ENDOSCOPY         Restrictions/Precautions:  General Precautions, Fall Risk        Spinal Precautions: No Bending, No Lifting, No Twisting  Other position/activity restrictions: drains       Prior Level of Function:  ADL Assistance: Independent  Homemaking Assistance: Needs assistance (report niece will clean for Pt)  Ambulation Assistance: Independent  Transfer Assistance: Independent  Additional Comments: per pt niece to stay with him at discharge as long as he needs to assist. Pt reports using RW prior to sx. Subjective:     Subjective: RN approved therapy session, patient sitting in BS chair and agreeable to therapy. Pt impulsive with movements, when asked to marisol back brace patient stands up to put on. Patient states he does not like to be told what to do and will do things \"his way\" when he gets home. Patient with increased agitation due to c/o with breakfast.    Pain:  Yes. Pain Assessment  Pain Assessment: 0-10  Pain Level: 6  Pain Type: Surgical pain  Pain Location: Back       Social/Functional:  Lives With: Alone  Type of Home: House  Home Layout: Two level, Able to Live on Main level with bedroom/bathroom  Home Access: Stairs to enter with rails  Entrance Stairs - Number of Steps: 3  Home Equipment: Rolling walker, Sock aid, Reacher     Objective:       Transfers  Sit to Stand: Stand by assistance  Stand to sit: Stand by assistance       Ambulation 1  Surface: level tile  Device: Rolling Walker  Assistance: Contact guard assistance  Quality of Gait: Pt amb with increased trunk flexion, decreased kylee and speed, decreased step length, steady without LOB. Distance: 100 feet x 1   Comments: Patient took a standing rest break assisted though ambulation         Exercises:  Exercises  Comments: Patient performs bilateral LE sitting therex, heel raises, hip abd/add, long arc quad all x 15 reps to assist increase strength for improved functional mobility.        Activity Tolerance:  Activity Tolerance: Patient Tolerated treatment well  Activity Tolerance: Pt. requires many cues for safety and back secondary to short ELOS            AM-PAC Inpatient Mobility without Stair Climbing Raw Score : 14  AM-PAC Inpatient without Stair Climbing T-Scale Score : 40.85  Mobility Inpatient CMS 0-100% Score: 53.33  Mobility Inpatient without Stair CMS G-Code Modifier : CK

## 2018-08-11 NOTE — PROGRESS NOTES
Hospitalist Progress Note    Patient:  Milena Dumont      Unit/Bed:7K-07/007-A    YOB: 1940    MRN: [id number]       Acct: [de-identified]     PCP: Jani Robles MD    Date of Admission: 8/6/2018    Chief comlaint:  Post-op med management      Subjective: sitting up in chair, stating back pain under control, denies sob, fever, nausea, abdominal pain Still no BM. Medications:  Reviewed    Infusion Medications    dextrose       Scheduled Medications    sodium phosphate  1 enema Rectal Once    polyethylene glycol  17 g Oral Daily    hydrALAZINE  25 mg Oral 3 times per day    insulin lispro  30 Units Subcutaneous TID WC    insulin glargine  44 Units Subcutaneous Nightly    simvastatin  40 mg Oral Nightly    cloNIDine  0.3 mg Oral TID    carvedilol  12.5 mg Oral BID WC    allopurinol  100 mg Oral Daily    oxybutynin  1 tablet Oral Daily    sodium chloride flush  10 mL Intravenous 2 times per day    insulin lispro  0-12 Units Subcutaneous TID WC    insulin lispro  0-6 Units Subcutaneous Nightly     PRN Meds: bisacodyl, sennosides-docusate sodium, sodium chloride flush, acetaminophen, acetaminophen, cyclobenzaprine, magnesium hydroxide, oxyCODONE-acetaminophen **OR** oxyCODONE-acetaminophen, HYDROmorphone **OR** HYDROmorphone, glucose, dextrose, glucagon (rDNA), dextrose, ondansetron      Intake/Output Summary (Last 24 hours) at 08/11/18 1054  Last data filed at 08/11/18 0603   Gross per 24 hour   Intake             1020 ml   Output               91 ml   Net              929 ml       Diet: DIET CARB CONTROL; Dietary Nutrition Supplements: Other Oral Supplement (see comment)    Exam:  BP (!) 186/85   Pulse 98   Temp 99 °F (37.2 °C) (Oral)   Resp 16   Wt 277 lb (125.6 kg)   SpO2 93%   BMI 38.63 kg/m²      General appearance: No apparent distress, cooperative, morbidly obese   HEENT: NC/AT. Conjunctivae/corneas clear. Neck: Supple. No adenopathy and thyromegaly. Respiratory:  Clear to auscultation, without Rales/Wheezes/Rhonchi. Normal respiratory effort. Cardiovascular: RRR, normal S1/S2 without murmurs, rubs or gallops. No JVD. BLE pitting edema +  Abdomen: non-distended, BS+, soft, non-tender  Musculoskeletal:  Lower back in surgical dressing. 2 drains in place.   Skin: Skin color, texture, turgor normal.  A 0.5 cm blister over inner aspect of left thigh. Neurologic:  Neurovascularly intact without any focal sensory/motor deficits. Psychiatric: Alert and oriented    Labs:   Recent Labs      08/09/18   0631  08/10/18   0654   WBC  10.8  9.3   HGB  8.0*  7.9*   HCT  25.6*  25.4*   PLT  209  236     Recent Labs      08/10/18   0654   NA  136   K  4.2   CL  105   CO2  17*   BUN  40*   CREATININE  2.1*   CALCIUM  8.0*     No results for input(s): AST, ALT, BILIDIR, BILITOT, ALKPHOS in the last 72 hours. No results for input(s): INR in the last 72 hours. No results for input(s): Gupta Escudero in the last 72 hours. Urinalysis:    Lab Results   Component Value Date    NITRU NEGATIVE 08/08/2018    WBCUA 5-10 08/08/2018    BACTERIA NONE 08/08/2018    RBCUA 5-10 08/08/2018    BLOODU NEGATIVE 08/08/2018    SPECGRAV 1.025 08/08/2018       Radiology:  XR LUMBAR SPINE (2-3 VIEWS)   Final Result   Postop appearance lumbar spine. **This report has been created using voice recognition software. It may contain minor errors which are inherent in voice recognition technology. **      Final report electronically signed by Dr. Naomi Soliman on 8/6/2018 1:51 PM      XR Lumbar Spine 1 VW   Final Result   Intraoperative  appearance of the lumbar spine. **This report has been created using voice recognition software. It may contain minor errors which are inherent in voice recognition technology. **      Final report electronically signed by Dr. Naomi Soliman on 8/6/2018 2:05 PM      XR Lumbar Spine 1 VW   Final Result   Intraoperative  appearance of the lumbar spine. **This report has been created using voice recognition software. It may contain minor errors which are inherent in voice recognition technology. **      Final report electronically signed by Dr. Patricio Ryder on 8/6/2018 1:49 PM           Assessment/Plan:    1. Spinal stenosis:  S/p surgery. Pain control, DVT prophylaxis. PT/OT. managed per ortho  2. HTN: BP overall controlled with occasional elevated. Cont home coreg,clonidine,hydralazine . 3. DM2: A1c in July 10 was 8.2 BG controlled. Cont home insulin regimen (lantus 44 units nightly, humalog 30 units tidac)  plus ISS   4. 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 Now back to 2.1. Likely with components of ADELINA from marco a-op dehydration. Will cont to follow, avoid nephrotoxins. 5. HLD: cont statin    6. Anemia: baseline ~10,. Was 9.1 after surgery, cont trending down, likely acute blood loss from surgery and drain, Cont f/u CBC, transfusion if Hb<7.0  7. Edema: BLE edema. Unchanged. Not on diuresis at home. Keep LE elevated. Compression stockings. F/u as outpatient for workup and management. 8. Morbid obesity: encouraged life style modifications       Code Status: Full Code    DVT prophylaxis: [] Lovenox                                 [x] SCDs                                 [] SQ Heparin                                 [] Encourage ambulation           [] Already on Anticoagulation    Anticipated Discharge on : per ortho.         Disposition:    [x] Home refused TCU       [] TCU       [] Rehab       [] Psych       [] SNF       [] [place]       [] Other-     PT/OT Eval Status: on board     Electronically signed by Jackie Marquez MD on 8/11/2018 at 10:54 AM

## 2018-08-12 VITALS
HEART RATE: 54 BPM | BODY MASS INDEX: 38.63 KG/M2 | OXYGEN SATURATION: 98 % | TEMPERATURE: 97.9 F | RESPIRATION RATE: 16 BRPM | WEIGHT: 277 LBS | DIASTOLIC BLOOD PRESSURE: 102 MMHG | SYSTOLIC BLOOD PRESSURE: 154 MMHG

## 2018-08-12 LAB
GLUCOSE BLD-MCNC: 149 MG/DL (ref 70–108)
GLUCOSE BLD-MCNC: 180 MG/DL (ref 70–108)

## 2018-08-12 PROCEDURE — 6370000000 HC RX 637 (ALT 250 FOR IP): Performed by: ORTHOPAEDIC SURGERY

## 2018-08-12 PROCEDURE — 6370000000 HC RX 637 (ALT 250 FOR IP): Performed by: INTERNAL MEDICINE

## 2018-08-12 PROCEDURE — 6370000000 HC RX 637 (ALT 250 FOR IP): Performed by: PHYSICIAN ASSISTANT

## 2018-08-12 PROCEDURE — 82948 REAGENT STRIP/BLOOD GLUCOSE: CPT

## 2018-08-12 RX ORDER — FERROUS SULFATE 300 MG/5ML
300 LIQUID (ML) ORAL DAILY
Qty: 300 ML | Refills: 3 | Status: SHIPPED | OUTPATIENT
Start: 2018-08-12 | End: 2019-03-08

## 2018-08-12 RX ADMIN — OXYBUTYNIN CHLORIDE 10 MG: 10 TABLET, EXTENDED RELEASE ORAL at 08:50

## 2018-08-12 RX ADMIN — MAGNESIUM HYDROXIDE 30 ML: 400 SUSPENSION ORAL at 08:50

## 2018-08-12 RX ADMIN — ALLOPURINOL 100 MG: 100 TABLET ORAL at 08:50

## 2018-08-12 RX ADMIN — Medication 2 UNITS: at 08:50

## 2018-08-12 RX ADMIN — CLONIDINE HYDROCHLORIDE 0.3 MG: 0.2 TABLET ORAL at 08:50

## 2018-08-12 RX ADMIN — CYCLOBENZAPRINE HYDROCHLORIDE 10 MG: 10 TABLET, FILM COATED ORAL at 00:41

## 2018-08-12 RX ADMIN — DOCUSATE SODIUM AND SENNOSIDES 2 TABLET: 8.6; 5 TABLET, FILM COATED ORAL at 08:50

## 2018-08-12 RX ADMIN — INSULIN LISPRO 30 UNITS: 100 INJECTION, SOLUTION INTRAVENOUS; SUBCUTANEOUS at 08:55

## 2018-08-12 RX ADMIN — CARVEDILOL 12.5 MG: 6.25 TABLET, FILM COATED ORAL at 08:50

## 2018-08-12 RX ADMIN — OXYCODONE HYDROCHLORIDE AND ACETAMINOPHEN 1 TABLET: 5; 325 TABLET ORAL at 00:42

## 2018-08-12 RX ADMIN — POLYETHYLENE GLYCOL 3350 17 G: 17 POWDER, FOR SOLUTION ORAL at 08:50

## 2018-08-12 RX ADMIN — HYDRALAZINE HYDROCHLORIDE 25 MG: 25 TABLET, FILM COATED ORAL at 08:50

## 2018-08-12 ASSESSMENT — PAIN DESCRIPTION - ORIENTATION: ORIENTATION: MID;LOWER

## 2018-08-12 ASSESSMENT — PAIN DESCRIPTION - DESCRIPTORS: DESCRIPTORS: ACHING

## 2018-08-12 ASSESSMENT — PAIN DESCRIPTION - ONSET: ONSET: ON-GOING

## 2018-08-12 ASSESSMENT — PAIN DESCRIPTION - LOCATION: LOCATION: BACK

## 2018-08-12 ASSESSMENT — PAIN SCALES - GENERAL
PAINLEVEL_OUTOF10: 6
PAINLEVEL_OUTOF10: 0

## 2018-08-12 ASSESSMENT — PAIN DESCRIPTION - FREQUENCY: FREQUENCY: INTERMITTENT

## 2018-08-12 ASSESSMENT — PAIN DESCRIPTION - PROGRESSION: CLINICAL_PROGRESSION: NOT CHANGED

## 2018-08-12 ASSESSMENT — PAIN DESCRIPTION - PAIN TYPE: TYPE: SURGICAL PAIN

## 2018-08-14 NOTE — DISCHARGE SUMMARY
[address] [place] [address]                                 DISCHARGE SUMMARY    PATIENT NAME: Yolande Bowens                  :        1940  MED REC NO:   [id number]                           ROOM:       0007  ACCOUNT NO:   [de-identified]                           ADMIT DATE: 2018  PROVIDER:     Ree York Pa-C                    [facility] DATE: 2018      DISCHARGE DIAGNOSES:  1. Lumbar spinal stenosis. 2.  Status post previous L3 to S1 decompressive laminectomy done in  2017. 3.  Obesity with a body mass index of 38.  4.  Symptoms of bilateral neurogenic claudication and leg radiculopathy. OPERATIONS PERFORMED:  Revision bilateral hemilaminectomy from L3 to L5  with revision nerve root exploration from L3 to L5. In addition, there was  an L3 to S1 bilateral posterolateral fusion of the lumbar spine. HOSPITAL COURSE:  The patient is 66years of age, presented to our office  having symptoms of lumbar pain and leg radiculopathy. He had been  previously treated with a lumbar spine laminectomy, but the past year and a  half developed recurrent stenosis within the recess and foramina throughout  the lumbar spine. At this point in time, we took him back for further  decompression and fusion of the lumbar spine. He was then admitted to the  ICU, unit 4D, at [address] [place] [address] after undergoing surgery on  2018. The patient spent a total of seven days in the hospital.  On  postoperative day #1, he did note that his legs felt better and he had some  minimal back pain. He was ready to work with some physical therapy and  through the next several days did progress with physical therapy, going  from a three-person assist to two, ambulating well with minimal assistance. The patient, throughout his stay, did maintain full 5/5 strength.   He did  have difficulty with having a bowel movement for the first several days,  but on day #6 was able to have a bowel movement with the help of magnesium  citrate. In addition, throughout his stay, we did attempt to get the  patient to consider a stay in the TCU for further rehab; however, he did  refuse this multiple times and was scheduled for discharge home with home  health, and Physical Therapy and Occupational Therapy to come to the house. The Internal Medicine team was also going along during his stay to help  manage his hypertension, diabetes, and chronic kidney disease. Overall,  the patient did well. The Internal Medicine team did attempt to give the  patient a unit of packed red blood cells due to a hemoglobin of 7.9, which  he refused. They, therefore, sent him home with some iron tabs to help  with postoperative anemia. The patient was then sent home on his  postoperative day #6, on 08/12/2018. Drains were removed, dressings were  changed prior to discharge. He did have a bowel movement prior to  discharge. DISCHARGE INSTRUCTIONS:  Include no lifting, bending, or twisting for  several months as he heals. He will follow up with us in two weeks in the  office to be evaluated with repeat lumbar spine x-rays to see how he is  doing clinically. He was discharged home with no specific pain  medications. He does report having tramadol at home and was no longer  taking any Percocet or OxyContin in the hospital.  He was taking Tylenol  with adequate pain relief with that.         Yissel Muniz [place]    D: 08/13/2018 11:20:34       T: 08/13/2018 16:28:06     [email or web address]  Job#: [id number]     Doc#: [id number]    CC:  Grey Valderrama MD

## 2018-08-27 LAB
ALBUMIN SERPL-MCNC: 2.8 G/DL
ALP BLD-CCNC: 173 U/L
ALT SERPL-CCNC: 15 U/L
ANION GAP SERPL CALCULATED.3IONS-SCNC: 13 MMOL/L
AST SERPL-CCNC: 16 U/L
BASOPHILS ABSOLUTE: ABNORMAL /ΜL
BASOPHILS RELATIVE PERCENT: ABNORMAL %
BILIRUB SERPL-MCNC: 0.2 MG/DL (ref 0.1–1.4)
BUN BLDV-MCNC: 30 MG/DL
CALCIUM SERPL-MCNC: 8.6 MG/DL
CHLORIDE BLD-SCNC: 103 MMOL/L
CO2: 25 MMOL/L
CREAT SERPL-MCNC: 2.4 MG/DL
EOSINOPHILS ABSOLUTE: ABNORMAL /ΜL
EOSINOPHILS RELATIVE PERCENT: ABNORMAL %
GFR CALCULATED: 28
GLUCOSE BLD-MCNC: 88 MG/DL
HCT VFR BLD CALC: 26.6 % (ref 41–53)
HEMOGLOBIN: 8.8 G/DL (ref 13.5–17.5)
LYMPHOCYTES ABSOLUTE: ABNORMAL /ΜL
LYMPHOCYTES RELATIVE PERCENT: ABNORMAL %
MCH RBC QN AUTO: ABNORMAL PG
MCHC RBC AUTO-ENTMCNC: ABNORMAL G/DL
MCV RBC AUTO: ABNORMAL FL
MONOCYTES ABSOLUTE: ABNORMAL /ΜL
MONOCYTES RELATIVE PERCENT: ABNORMAL %
NEUTROPHILS ABSOLUTE: ABNORMAL /ΜL
NEUTROPHILS RELATIVE PERCENT: ABNORMAL %
PLATELET # BLD: 572 K/ΜL
PMV BLD AUTO: ABNORMAL FL
POTASSIUM SERPL-SCNC: 4.4 MMOL/L
RBC # BLD: 3.6 10^6/ΜL
SODIUM BLD-SCNC: 137 MMOL/L
TOTAL PROTEIN: 7.5
WBC # BLD: 10.19 10^3/ML

## 2018-09-14 ENCOUNTER — OFFICE VISIT (OUTPATIENT)
Dept: NEPHROLOGY | Age: 78
End: 2018-09-14
Payer: MEDICARE

## 2018-09-14 VITALS
WEIGHT: 275 LBS | SYSTOLIC BLOOD PRESSURE: 122 MMHG | DIASTOLIC BLOOD PRESSURE: 60 MMHG | HEART RATE: 64 BPM | RESPIRATION RATE: 18 BRPM | BODY MASS INDEX: 38.35 KG/M2

## 2018-09-14 DIAGNOSIS — D63.8 ANEMIA OF CHRONIC DISEASE: ICD-10-CM

## 2018-09-14 DIAGNOSIS — R80.1 PERSISTENT PROTEINURIA: ICD-10-CM

## 2018-09-14 DIAGNOSIS — N18.3 DIABETES MELLITUS DUE TO UNDERLYING CONDITION WITH STAGE 3 CHRONIC KIDNEY DISEASE, UNSPECIFIED WHETHER LONG TERM INSULIN USE: ICD-10-CM

## 2018-09-14 DIAGNOSIS — E55.9 VITAMIN D DEFICIENCY: ICD-10-CM

## 2018-09-14 DIAGNOSIS — E08.22 DIABETES MELLITUS DUE TO UNDERLYING CONDITION WITH STAGE 3 CHRONIC KIDNEY DISEASE, UNSPECIFIED WHETHER LONG TERM INSULIN USE: ICD-10-CM

## 2018-09-14 DIAGNOSIS — E87.20 METABOLIC ACIDOSIS: ICD-10-CM

## 2018-09-14 DIAGNOSIS — N18.30 CHRONIC KIDNEY DISEASE, STAGE III (MODERATE) (HCC): Primary | ICD-10-CM

## 2018-09-14 DIAGNOSIS — I10 ESSENTIAL HYPERTENSION: ICD-10-CM

## 2018-09-14 PROCEDURE — 1101F PT FALLS ASSESS-DOCD LE1/YR: CPT | Performed by: INTERNAL MEDICINE

## 2018-09-14 PROCEDURE — 1123F ACP DISCUSS/DSCN MKR DOCD: CPT | Performed by: INTERNAL MEDICINE

## 2018-09-14 PROCEDURE — G8417 CALC BMI ABV UP PARAM F/U: HCPCS | Performed by: INTERNAL MEDICINE

## 2018-09-14 PROCEDURE — G8427 DOCREV CUR MEDS BY ELIG CLIN: HCPCS | Performed by: INTERNAL MEDICINE

## 2018-09-14 PROCEDURE — 1036F TOBACCO NON-USER: CPT | Performed by: INTERNAL MEDICINE

## 2018-09-14 PROCEDURE — 4040F PNEUMOC VAC/ADMIN/RCVD: CPT | Performed by: INTERNAL MEDICINE

## 2018-09-14 PROCEDURE — 99213 OFFICE O/P EST LOW 20 MIN: CPT | Performed by: INTERNAL MEDICINE

## 2018-09-14 NOTE — PROGRESS NOTES
negative  Musculoskeletal:positive for arthralgias and stiff joints  Neurological: positive for coordination problems and gait problems  Behavioral/Psych: negative  Endocrine: negative  Allergic/Immunologic: negative  Medications:     Current Outpatient Prescriptions   Medication Sig Dispense Refill    ferrous sulfate 300 (60 Fe) MG/5ML syrup Take 5 mLs by mouth daily May substitute with ferrous sulfate 325 mg daily 300 mL 3    Specialty Vitamins Products (PROSTATE PO) Take by mouth every morning      traMADol (ULTRAM) 50 MG tablet Take 50 mg by mouth every 6 hours as needed for Pain. Vida Ortega hydrALAZINE (APRESOLINE) 25 MG tablet Take 25 mg by mouth 2 times daily      oxybutynin (DITROPAN-XL) 10 MG extended release tablet TAKE ONE TABLET BY MOUTH DAILY 30 tablet 4    allopurinol (ZYLOPRIM) 100 MG tablet Take 100 mg by mouth daily      carvedilol (COREG) 12.5 MG tablet Take 12.5 mg by mouth 2 times daily       cloNIDine (CATAPRES) 0.3 MG tablet Take 0.3 mg by mouth 3 times daily       aspirin 81 MG tablet Take 81 mg by mouth daily.  insulin lispro (HUMALOG KWIKPEN) 100 UNIT/ML injection Inject 30 Units into the skin 3 times daily (before meals)       insulin glargine (LANTUS SOLOSTAR) 100 UNIT/ML injection Inject 44 Units into the skin nightly       simvastatin (ZOCOR) 40 MG tablet Take 40 mg by mouth nightly. No current facility-administered medications for this visit.         Lab Results:    CBC:   Lab Results   Component Value Date    WBC 9.3 08/10/2018    HGB 7.9 (L) 08/10/2018    HCT 25.4 (L) 08/10/2018    MCV 85.8 08/10/2018     08/10/2018     BMP:    Lab Results   Component Value Date     08/10/2018     08/07/2018     07/10/2018    K 4.2 08/10/2018    K 4.5 08/07/2018    K 4.8 07/10/2018     08/10/2018     08/07/2018     07/10/2018    CO2 17 (L) 08/10/2018    CO2 20 (L) 08/07/2018    CO2 21 (L) 07/10/2018    BUN 40 (H) 08/10/2018    BUN 41 (H) 08/07/2018    BUN 36 (H) 07/10/2018    CREATININE 2.1 (H) 08/10/2018    CREATININE 2.6 (H) 08/07/2018    CREATININE 1.9 (H) 07/10/2018    GLUCOSE 90 08/10/2018    GLUCOSE 70 08/07/2018    GLUCOSE 258 (H) 07/10/2018      Hepatic:   Lab Results   Component Value Date    AST 16 01/17/2017    ALT 18 01/17/2017    BILITOT 0.2 01/17/2017    ALKPHOS 128 01/17/2017     BNP: No results found for: BNP  Lipids: No results found for: CHOL, HDL  INR:   Lab Results   Component Value Date    INR 0.98 07/25/2014    INR 0.95 07/03/2014    INR 0.94 05/30/2014     URINE: No results found for: NAUR, PROTUR  Lab Results   Component Value Date    NITRU NEGATIVE 08/08/2018    COLORU YELLOW 08/08/2018    PHUR 5.5 08/08/2018    LABCAST 4-8 HYALINE 08/08/2018    LABCAST NONE SEEN 08/08/2018    WBCUA 5-10 08/08/2018    RBCUA 5-10 08/08/2018    YEAST NONE SEEN 08/08/2018    BACTERIA NONE 08/08/2018    SPECGRAV 1.025 08/08/2018    LEUKOCYTESUR SMALL 08/08/2018    UROBILINOGEN 0.2 08/08/2018    BILIRUBINUR NEGATIVE 08/08/2018    BLOODU NEGATIVE 08/08/2018    KETUA NEGATIVE 08/08/2018      Microalbumen/Creatinine ratio:  No components found for: RUCREAT    Objective:   Vitals: /60   Pulse 64   Resp 18   Wt 275 lb (124.7 kg)   BMI 38.35 kg/m²      Constitutional:  Alert, awake, no apparent distress  Skin:normal  HEENT:Pupils are reactive . Throat is clear  Neck:supple with no thyromegally  Cardiovascular:  S1, S2 without murmur  Respiratory:  Clear  Abdomen: +bs, soft, non tender  Ext: + LE edema  Musculoskeletal:Intact and incision lower back noted from recent surgery  Neuro:Alert and oriented with no deficit    Electronically signed by Logan Canales MD on 9/14/2018 at 10:16 AM

## 2018-09-25 LAB
ALBUMIN SERPL-MCNC: 3.8 G/DL
ALP BLD-CCNC: 166 U/L
ALT SERPL-CCNC: 8 U/L
ANION GAP SERPL CALCULATED.3IONS-SCNC: 11 MMOL/L
AST SERPL-CCNC: 10 U/L
BASOPHILS ABSOLUTE: ABNORMAL /ΜL
BASOPHILS RELATIVE PERCENT: ABNORMAL %
BILIRUB SERPL-MCNC: 0.2 MG/DL (ref 0.1–1.4)
BUN BLDV-MCNC: 31 MG/DL
CALCIUM SERPL-MCNC: 8.4 MG/DL
CHLORIDE BLD-SCNC: 104 MMOL/L
CO2: 23 MMOL/L
CREAT SERPL-MCNC: 1.9 MG/DL
EOSINOPHILS ABSOLUTE: ABNORMAL /ΜL
EOSINOPHILS RELATIVE PERCENT: ABNORMAL %
GFR CALCULATED: 33
GLUCOSE BLD-MCNC: 245 MG/DL
HCT VFR BLD CALC: 26.4 % (ref 41–53)
HEMOGLOBIN: 8.3 G/DL (ref 13.5–17.5)
LYMPHOCYTES ABSOLUTE: ABNORMAL /ΜL
LYMPHOCYTES RELATIVE PERCENT: ABNORMAL %
MCH RBC QN AUTO: ABNORMAL PG
MCHC RBC AUTO-ENTMCNC: ABNORMAL G/DL
MCV RBC AUTO: ABNORMAL FL
MONOCYTES ABSOLUTE: ABNORMAL /ΜL
MONOCYTES RELATIVE PERCENT: ABNORMAL %
NEUTROPHILS ABSOLUTE: ABNORMAL /ΜL
NEUTROPHILS RELATIVE PERCENT: ABNORMAL %
PLATELET # BLD: 324 K/ΜL
PMV BLD AUTO: ABNORMAL FL
POTASSIUM SERPL-SCNC: 4.5 MMOL/L
RBC # BLD: 3.38 10^6/ΜL
SODIUM BLD-SCNC: 138 MMOL/L
TOTAL PROTEIN: 6.9
WBC # BLD: 8.6 10^3/ML

## 2018-11-26 LAB
ALBUMIN SERPL-MCNC: 3 G/DL
ALP BLD-CCNC: 166 U/L
ALT SERPL-CCNC: 15 U/L
ANION GAP SERPL CALCULATED.3IONS-SCNC: 13 MMOL/L
AST SERPL-CCNC: 14 U/L
AVERAGE GLUCOSE: NORMAL
BASOPHILS ABSOLUTE: ABNORMAL /ΜL
BASOPHILS RELATIVE PERCENT: ABNORMAL %
BILIRUB SERPL-MCNC: 0.3 MG/DL (ref 0.1–1.4)
BUN BLDV-MCNC: 31 MG/DL
CALCIUM SERPL-MCNC: 8.7 MG/DL
CHLORIDE BLD-SCNC: 103 MMOL/L
CO2: 27 MMOL/L
CREAT SERPL-MCNC: 1.9 MG/DL
EOSINOPHILS ABSOLUTE: ABNORMAL /ΜL
EOSINOPHILS RELATIVE PERCENT: ABNORMAL %
FERRITIN: 53 NG/ML (ref 18–300)
FOLATE: 11.9
GFR CALCULATED: 37
GLUCOSE BLD-MCNC: 133 MG/DL
HBA1C MFR BLD: 9.1 %
HCT VFR BLD CALC: 34 % (ref 41–53)
HEMOGLOBIN: 10.5 G/DL (ref 13.5–17.5)
LYMPHOCYTES ABSOLUTE: ABNORMAL /ΜL
LYMPHOCYTES RELATIVE PERCENT: ABNORMAL %
MCH RBC QN AUTO: ABNORMAL PG
MCHC RBC AUTO-ENTMCNC: ABNORMAL G/DL
MCV RBC AUTO: ABNORMAL FL
MONOCYTES ABSOLUTE: ABNORMAL /ΜL
MONOCYTES RELATIVE PERCENT: ABNORMAL %
NEUTROPHILS ABSOLUTE: ABNORMAL /ΜL
NEUTROPHILS RELATIVE PERCENT: ABNORMAL %
PHOSPHORUS: 4.4 MG/DL
PLATELET # BLD: 303 K/ΜL
PMV BLD AUTO: ABNORMAL FL
POTASSIUM SERPL-SCNC: 4.1 MMOL/L
PTH INTACT: 97.6
RBC # BLD: 4.23 10^6/ΜL
SODIUM BLD-SCNC: 139 MMOL/L
TOTAL PROTEIN: 7.8
VITAMIN B-12: 737
VITAMIN D 25-HYDROXY: 41.3
VITAMIN D2, 25 HYDROXY: NORMAL
VITAMIN D3,25 HYDROXY: NORMAL
WBC # BLD: 8.18 10^3/ML

## 2019-01-09 LAB
ALBUMIN SERPL-MCNC: 3 G/DL
ALP BLD-CCNC: 183 U/L
ALT SERPL-CCNC: 12 U/L
ANION GAP SERPL CALCULATED.3IONS-SCNC: 14 MMOL/L
AST SERPL-CCNC: 13 U/L
BASOPHILS ABSOLUTE: 0.04 /ΜL
BASOPHILS RELATIVE PERCENT: 0.6 %
BILIRUB SERPL-MCNC: 0.2 MG/DL (ref 0.1–1.4)
BUN BLDV-MCNC: 34 MG/DL
CALCIUM SERPL-MCNC: 8.7 MG/DL
CHLORIDE BLD-SCNC: 99 MMOL/L
CO2: 25 MMOL/L
CREAT SERPL-MCNC: 2 MG/DL
EOSINOPHILS ABSOLUTE: 0.1 /ΜL
EOSINOPHILS RELATIVE PERCENT: 1.6 %
GFR CALCULATED: 35
GLUCOSE BLD-MCNC: 490 MG/DL
HCT VFR BLD CALC: 34.3 % (ref 41–53)
HEMOGLOBIN: 10.7 G/DL (ref 13.5–17.5)
LYMPHOCYTES ABSOLUTE: 1.27 /ΜL
LYMPHOCYTES RELATIVE PERCENT: 20.2 %
MCH RBC QN AUTO: 25.2 PG
MCHC RBC AUTO-ENTMCNC: 31.1 G/DL
MCV RBC AUTO: 80.8 FL
MONOCYTES ABSOLUTE: 0.57 /ΜL
MONOCYTES RELATIVE PERCENT: 9.1 %
NEUTROPHILS ABSOLUTE: 4.28 /ΜL
NEUTROPHILS RELATIVE PERCENT: 68.2 %
PLATELET # BLD: 295 K/ΜL
PMV BLD AUTO: 8.9 FL
POTASSIUM SERPL-SCNC: 4.6 MMOL/L
RBC # BLD: 4.24 10^6/ΜL
SODIUM BLD-SCNC: 133 MMOL/L
TOTAL PROTEIN: 8
VITAMIN B-12: 785
WBC # BLD: 6.28 10^3/ML

## 2019-03-05 PROBLEM — E55.9 VITAMIN D DEFICIENCY: Status: RESOLVED | Noted: 2018-09-14 | Resolved: 2019-03-05

## 2019-03-05 PROBLEM — N17.9 AKI (ACUTE KIDNEY INJURY) (HCC): Status: RESOLVED | Noted: 2017-02-24 | Resolved: 2019-03-05

## 2019-03-08 ENCOUNTER — OFFICE VISIT (OUTPATIENT)
Dept: NEPHROLOGY | Age: 79
End: 2019-03-08
Payer: MEDICARE

## 2019-03-08 VITALS
SYSTOLIC BLOOD PRESSURE: 117 MMHG | DIASTOLIC BLOOD PRESSURE: 60 MMHG | WEIGHT: 270 LBS | BODY MASS INDEX: 37.66 KG/M2 | HEART RATE: 67 BPM | OXYGEN SATURATION: 96 %

## 2019-03-08 DIAGNOSIS — N18.30 CKD (CHRONIC KIDNEY DISEASE), STAGE III (HCC): Primary | ICD-10-CM

## 2019-03-08 DIAGNOSIS — E08.22 DIABETES MELLITUS DUE TO UNDERLYING CONDITION WITH STAGE 3 CHRONIC KIDNEY DISEASE, UNSPECIFIED WHETHER LONG TERM INSULIN USE: ICD-10-CM

## 2019-03-08 DIAGNOSIS — D63.8 ANEMIA OF CHRONIC DISEASE: ICD-10-CM

## 2019-03-08 DIAGNOSIS — N18.3 DIABETES MELLITUS DUE TO UNDERLYING CONDITION WITH STAGE 3 CHRONIC KIDNEY DISEASE, UNSPECIFIED WHETHER LONG TERM INSULIN USE: ICD-10-CM

## 2019-03-08 DIAGNOSIS — I10 ESSENTIAL HYPERTENSION: ICD-10-CM

## 2019-03-08 PROCEDURE — G8484 FLU IMMUNIZE NO ADMIN: HCPCS | Performed by: INTERNAL MEDICINE

## 2019-03-08 PROCEDURE — G8427 DOCREV CUR MEDS BY ELIG CLIN: HCPCS | Performed by: INTERNAL MEDICINE

## 2019-03-08 PROCEDURE — 99213 OFFICE O/P EST LOW 20 MIN: CPT | Performed by: INTERNAL MEDICINE

## 2019-03-08 PROCEDURE — 1123F ACP DISCUSS/DSCN MKR DOCD: CPT | Performed by: INTERNAL MEDICINE

## 2019-03-08 PROCEDURE — 1036F TOBACCO NON-USER: CPT | Performed by: INTERNAL MEDICINE

## 2019-03-08 PROCEDURE — G8417 CALC BMI ABV UP PARAM F/U: HCPCS | Performed by: INTERNAL MEDICINE

## 2019-03-08 PROCEDURE — 1101F PT FALLS ASSESS-DOCD LE1/YR: CPT | Performed by: INTERNAL MEDICINE

## 2019-03-08 PROCEDURE — 4040F PNEUMOC VAC/ADMIN/RCVD: CPT | Performed by: INTERNAL MEDICINE

## 2019-03-08 RX ORDER — FERROUS SULFATE 325(65) MG
325 TABLET ORAL
COMMUNITY

## 2019-05-07 LAB
ALBUMIN SERPL-MCNC: 3.1 G/DL
ALP BLD-CCNC: 170 U/L
ALT SERPL-CCNC: 23 U/L
ANION GAP SERPL CALCULATED.3IONS-SCNC: NORMAL MMOL/L
AST SERPL-CCNC: 19 U/L
BASOPHILS ABSOLUTE: ABNORMAL /ΜL
BASOPHILS RELATIVE PERCENT: ABNORMAL %
BILIRUB SERPL-MCNC: 0.3 MG/DL (ref 0.1–1.4)
BUN BLDV-MCNC: 37 MG/DL
CALCIUM SERPL-MCNC: 8.5 MG/DL
CHLORIDE BLD-SCNC: 106 MMOL/L
CHOLESTEROL, TOTAL: 152 MG/DL
CHOLESTEROL/HDL RATIO: NORMAL
CO2: 27 MMOL/L
CREAT SERPL-MCNC: 2 MG/DL
EOSINOPHILS ABSOLUTE: ABNORMAL /ΜL
EOSINOPHILS RELATIVE PERCENT: ABNORMAL %
GFR CALCULATED: 32.5
GLUCOSE BLD-MCNC: 235 MG/DL
HCT VFR BLD CALC: 34.8 % (ref 41–53)
HDLC SERPL-MCNC: 42 MG/DL (ref 35–70)
HEMOGLOBIN: 12 G/DL (ref 13.5–17.5)
LDL CHOLESTEROL CALCULATED: 65 MG/DL (ref 0–160)
LYMPHOCYTES ABSOLUTE: ABNORMAL /ΜL
LYMPHOCYTES RELATIVE PERCENT: ABNORMAL %
MCH RBC QN AUTO: ABNORMAL PG
MCHC RBC AUTO-ENTMCNC: ABNORMAL G/DL
MCV RBC AUTO: ABNORMAL FL
MONOCYTES ABSOLUTE: ABNORMAL /ΜL
MONOCYTES RELATIVE PERCENT: ABNORMAL %
NEUTROPHILS ABSOLUTE: ABNORMAL /ΜL
NEUTROPHILS RELATIVE PERCENT: ABNORMAL %
PLATELET # BLD: 345 K/ΜL
PMV BLD AUTO: ABNORMAL FL
POTASSIUM SERPL-SCNC: 4.4 MMOL/L
RBC # BLD: 4.23 10^6/ΜL
SODIUM BLD-SCNC: 138 MMOL/L
TOTAL PROTEIN: 7.3
TRIGL SERPL-MCNC: 225 MG/DL
VLDLC SERPL CALC-MCNC: NORMAL MG/DL
WBC # BLD: 9.8 10^3/ML

## 2019-05-15 LAB
ALBUMIN SERPL-MCNC: 2.9 G/DL
ALP BLD-CCNC: 177 U/L
ALT SERPL-CCNC: 21 U/L
ANION GAP SERPL CALCULATED.3IONS-SCNC: 13 MMOL/L
AST SERPL-CCNC: 15 U/L
BASOPHILS ABSOLUTE: ABNORMAL /ΜL
BASOPHILS RELATIVE PERCENT: ABNORMAL %
BILIRUB SERPL-MCNC: 7.2 MG/DL (ref 0.1–1.4)
BUN BLDV-MCNC: 33 MG/DL
BUN BLDV-MCNC: NORMAL MG/DL
CALCIUM SERPL-MCNC: 8.8 MG/DL
CALCIUM SERPL-MCNC: NORMAL MG/DL
CHLORIDE BLD-SCNC: 105 MMOL/L
CHLORIDE BLD-SCNC: NORMAL MMOL/L
CO2: 27 MMOL/L
CO2: NORMAL MMOL/L
CREAT SERPL-MCNC: 1.8 MG/DL
CREAT SERPL-MCNC: NORMAL MG/DL
EOSINOPHILS ABSOLUTE: ABNORMAL /ΜL
EOSINOPHILS RELATIVE PERCENT: ABNORMAL %
GFR CALCULATED: 39
GFR CALCULATED: NORMAL
GLUCOSE BLD-MCNC: 254 MG/DL
GLUCOSE BLD-MCNC: NORMAL MG/DL
HCT VFR BLD CALC: 34.9 % (ref 41–53)
HEMOGLOBIN: 11.1 G/DL (ref 13.5–17.5)
LYMPHOCYTES ABSOLUTE: ABNORMAL /ΜL
LYMPHOCYTES RELATIVE PERCENT: ABNORMAL %
MCH RBC QN AUTO: ABNORMAL PG
MCHC RBC AUTO-ENTMCNC: ABNORMAL G/DL
MCV RBC AUTO: ABNORMAL FL
MONOCYTES ABSOLUTE: ABNORMAL /ΜL
MONOCYTES RELATIVE PERCENT: ABNORMAL %
NEUTROPHILS ABSOLUTE: ABNORMAL /ΜL
NEUTROPHILS RELATIVE PERCENT: ABNORMAL %
PLATELET # BLD: 291 K/ΜL
PMV BLD AUTO: ABNORMAL FL
POTASSIUM SERPL-SCNC: 4.2 MMOL/L
POTASSIUM SERPL-SCNC: NORMAL MMOL/L
RBC # BLD: 4.09 10^6/ΜL
SODIUM BLD-SCNC: 141 MMOL/L
SODIUM BLD-SCNC: NORMAL MMOL/L
TOTAL PROTEIN: 7.2
WBC # BLD: 8.01 10^3/ML

## (undated) DEVICE — SUTURE ETHLN SZ 2-0 L30IN NONABSORBABLE BLK L36MM FSLX 3/8 1674H

## (undated) DEVICE — 3M™ WARMING BLANKET, UPPER BODY, 10 PER CASE, 42268: Brand: BAIR HUGGER™

## (undated) DEVICE — Device

## (undated) DEVICE — 450 ML BOTTLE OF 0.05% CHLORHEXIDINE GLUCONATE IN 99.95% STERILE WATER FOR IRRIGATION, USP AND APPLICATOR.: Brand: IRRISEPT ANTIMICROBIAL WOUND LAVAGE

## (undated) DEVICE — LINER SUCT CANSTR 1500CC SEMI RIG W/ POR HYDROPHOBIC SHUT

## (undated) DEVICE — 1010 S-DRAPE TOWEL DRAPE 10/BX: Brand: STERI-DRAPE™

## (undated) DEVICE — LINE ASPIR 1/4 ANTICOAG

## (undated) DEVICE — CATHETER,FOLEY,100%SILICONE,14FR,10ML,LF: Brand: MEDLINE

## (undated) DEVICE — KIT POS TBL W O HDRST JACK

## (undated) DEVICE — GOWN,SIRUS,NONRNF,SETINSLV,XL,20/CS: Brand: MEDLINE

## (undated) DEVICE — GOWN,SIRUS,NON REINFRCD,LARGE,SET IN SL: Brand: MEDLINE

## (undated) DEVICE — 500ML,PRESSURE INFUSER W/STOPCOCK: Brand: MEDLINE

## (undated) DEVICE — DRESSING GRMCDL 6 12FR D1N CNTR HOLE 4MM ANTMCRBL PRTCTVE DI

## (undated) DEVICE — GLOVE ORANGE PI 8 1/2   MSG9085

## (undated) DEVICE — YANKAUER,BULB TIP,W/O VENT,RIGID,STERILE: Brand: MEDLINE

## (undated) DEVICE — SOLUTION IV IRRIG WATER 1000ML POUR BRL 2F7114

## (undated) DEVICE — ROYAL SILK SURGICAL GOWN, XXL: Brand: CONVERTORS

## (undated) DEVICE — THE MILL DISPOSABLE - MEDIUM

## (undated) DEVICE — SET CATH 20GA L1.75IN RAD ART POLYUR RADPQ W/ INTEGR

## (undated) DEVICE — GLOVE SURG SZ 65 THK91MIL LTX FREE SYN POLYISOPRENE

## (undated) DEVICE — TUBING PRSS 36 M F

## (undated) DEVICE — PROBE STIM 3 MM FOR PEDCL SCREW DISP

## (undated) DEVICE — 3M™ TRANSPORE™ WHITE SURGICAL TAPE 1534-2, 2 INCH X 10 YARD (5CM X 9,1M), 6 ROLLS/CARTON 10 CARTONS/CASE: Brand: 3M™ TRANSPORE™

## (undated) DEVICE — SET AUTOTRNS C175ML BOWL BTM OUTLT RESERVOIRXTRA

## (undated) DEVICE — BLADE CLIPPER GEN PURP NS

## (undated) DEVICE — SET CATH 20GA L1.5IN RAD ART POLYUR RADPQ W/ INTEGR 0.018IN

## (undated) DEVICE — PRESSURE MONITORING SET: Brand: TRUWAVE

## (undated) DEVICE — SPONGE GZ W4XL4IN COT 12 PLY TYP VII WVN C FLD DSGN

## (undated) DEVICE — BUR RND FLUT AGRSV 5MM

## (undated) DEVICE — KAIRISON TUBING SET PNEUMATIC, (3000 MM), STERILE, DISPOSABLE, TO BE USED WITH: FK898R, PACKAGE OF 10 PIECES: Brand: KAIRISON

## (undated) DEVICE — TUBING, SUCTION, 1/4" X 20', STRAIGHT: Brand: MEDLINE INDUSTRIES, INC.

## (undated) DEVICE — CONTAINER,SPECIMEN,PNEU TUBE,4OZ,OR STRL: Brand: MEDLINE

## (undated) DEVICE — BIPOLAR SEALER 23-112-1 AQM 6.0: Brand: AQUAMANTYS ®

## (undated) DEVICE — 3M™ BAIR HUGGER® MULTI ACCESS BLANKET, PEDIATRIC, FULL BODY, 10 PER CASE 31000: Brand: BAIR HUGGER™

## (undated) DEVICE — SUTURE PERMA-HAND SZ 3-0 L30IN NONABSORBABLE BLK L60MM KS 622H

## (undated) DEVICE — SUTURE ETHLN SZ 3-0 L18IN NONABSORBABLE BLK FS-1 L24MM 3/8 663H

## (undated) DEVICE — TOTAL TRAY, DB, 100% SILI FOLEY, 16FR 10: Brand: MEDLINE

## (undated) DEVICE — 4-PORT MANIFOLD: Brand: NEPTUNE 2

## (undated) DEVICE — GLOVE ORANGE PI 8   MSG9080

## (undated) DEVICE — DRAIN SURG 10FR PVC TB W/ TRCR MID PERF NO RESVR HUBLESS

## (undated) DEVICE — GLOVE ORANGE PI 7 1/2   MSG9075

## (undated) DEVICE — Z CONVERTED USE 2715898 SWABSTICK MEDICATED W1.75XL6.5IN 1.6ML 3.15% CHG 70% ISO

## (undated) DEVICE — CATHETER,FOLEY,SILI-ELAST,LTX,14FR,10ML: Brand: MEDLINE

## (undated) DEVICE — SPLINT ARMBRD W3XL10.5IN POLYFOAM DLX A LN

## (undated) DEVICE — GAUZE,SPONGE,4"X4",12PLY,STERILE,LF,2'S: Brand: MEDLINE

## (undated) DEVICE — CATHETER,FOLEY,100%SILICONE,12FR,10ML,LF: Brand: MEDLINE

## (undated) DEVICE — CODMAN® SURGICAL PATTIES 1" X 1" (2.54CM X 2.54CM): Brand: CODMAN®

## (undated) DEVICE — SOLUTION IV 500ML 0.9% SOD CHL PH 5 INJ USP VIAFLX PLAS

## (undated) DEVICE — DRESSING,GAUZE,XEROFORM,CURAD,5"X9",ST: Brand: CURAD

## (undated) DEVICE — 3M™ RANGER™ BLOOD/FLUID WARMING STANDARD FLOW SET, 24250, 10/CASE: Brand: 3M™ RANGER™

## (undated) DEVICE — GLOVE ORANGE PI 7   MSG9070

## (undated) DEVICE — SUTURE VCRL SZ 2-0 L27IN ABSRB UD L36MM CP-1 1/2 CIR REV J266H

## (undated) DEVICE — KIT EVAC 400CC PVC RADPQ Y CONN